# Patient Record
Sex: FEMALE | Race: WHITE | NOT HISPANIC OR LATINO | URBAN - METROPOLITAN AREA
[De-identification: names, ages, dates, MRNs, and addresses within clinical notes are randomized per-mention and may not be internally consistent; named-entity substitution may affect disease eponyms.]

---

## 2017-02-09 ENCOUNTER — COMMUNICATION - HEALTHEAST (OUTPATIENT)
Dept: INTERNAL MEDICINE | Facility: CLINIC | Age: 82
End: 2017-02-09

## 2017-03-13 ENCOUNTER — COMMUNICATION - HEALTHEAST (OUTPATIENT)
Dept: INTERNAL MEDICINE | Facility: CLINIC | Age: 82
End: 2017-03-13

## 2017-03-19 ENCOUNTER — COMMUNICATION - HEALTHEAST (OUTPATIENT)
Dept: INTERNAL MEDICINE | Facility: CLINIC | Age: 82
End: 2017-03-19

## 2017-04-05 ENCOUNTER — COMMUNICATION - HEALTHEAST (OUTPATIENT)
Dept: INTERNAL MEDICINE | Facility: CLINIC | Age: 82
End: 2017-04-05

## 2017-04-05 DIAGNOSIS — K21.00 REFLUX ESOPHAGITIS: ICD-10-CM

## 2017-07-01 ENCOUNTER — COMMUNICATION - HEALTHEAST (OUTPATIENT)
Dept: INTERNAL MEDICINE | Facility: CLINIC | Age: 82
End: 2017-07-01

## 2017-07-07 ENCOUNTER — COMMUNICATION - HEALTHEAST (OUTPATIENT)
Dept: INTERNAL MEDICINE | Facility: CLINIC | Age: 82
End: 2017-07-07

## 2017-07-28 ENCOUNTER — RECORDS - HEALTHEAST (OUTPATIENT)
Dept: ADMINISTRATIVE | Facility: OTHER | Age: 82
End: 2017-07-28

## 2017-07-28 ENCOUNTER — COMMUNICATION - HEALTHEAST (OUTPATIENT)
Dept: INTERNAL MEDICINE | Facility: CLINIC | Age: 82
End: 2017-07-28

## 2017-07-31 ENCOUNTER — COMMUNICATION - HEALTHEAST (OUTPATIENT)
Dept: INTERNAL MEDICINE | Facility: CLINIC | Age: 82
End: 2017-07-31

## 2017-08-03 ENCOUNTER — OFFICE VISIT - HEALTHEAST (OUTPATIENT)
Dept: INTERNAL MEDICINE | Facility: CLINIC | Age: 82
End: 2017-08-03

## 2017-08-03 ENCOUNTER — COMMUNICATION - HEALTHEAST (OUTPATIENT)
Dept: INTERNAL MEDICINE | Facility: CLINIC | Age: 82
End: 2017-08-03

## 2017-08-03 DIAGNOSIS — S70.02XA CONTUSION OF LEFT HIP: ICD-10-CM

## 2017-08-03 DIAGNOSIS — I21.4 NSTEMI (NON-ST ELEVATED MYOCARDIAL INFARCTION) (H): ICD-10-CM

## 2017-08-03 DIAGNOSIS — I25.5 ISCHEMIC CARDIOMYOPATHY: ICD-10-CM

## 2017-08-03 DIAGNOSIS — R55 SYNCOPE AND COLLAPSE: ICD-10-CM

## 2017-08-03 DIAGNOSIS — R73.9 HYPERGLYCEMIA: ICD-10-CM

## 2017-08-16 ENCOUNTER — COMMUNICATION - HEALTHEAST (OUTPATIENT)
Dept: INTERNAL MEDICINE | Facility: CLINIC | Age: 82
End: 2017-08-16

## 2017-08-17 ENCOUNTER — COMMUNICATION - HEALTHEAST (OUTPATIENT)
Dept: INTERNAL MEDICINE | Facility: CLINIC | Age: 82
End: 2017-08-17

## 2017-08-18 ENCOUNTER — RECORDS - HEALTHEAST (OUTPATIENT)
Dept: ADMINISTRATIVE | Facility: OTHER | Age: 82
End: 2017-08-18

## 2017-08-18 ENCOUNTER — COMMUNICATION - HEALTHEAST (OUTPATIENT)
Dept: INTERNAL MEDICINE | Facility: CLINIC | Age: 82
End: 2017-08-18

## 2017-08-21 ENCOUNTER — COMMUNICATION - HEALTHEAST (OUTPATIENT)
Dept: INTERNAL MEDICINE | Facility: CLINIC | Age: 82
End: 2017-08-21

## 2017-08-22 ENCOUNTER — COMMUNICATION - HEALTHEAST (OUTPATIENT)
Dept: INTERNAL MEDICINE | Facility: CLINIC | Age: 82
End: 2017-08-22

## 2017-08-22 ENCOUNTER — COMMUNICATION - HEALTHEAST (OUTPATIENT)
Dept: SCHEDULING | Facility: CLINIC | Age: 82
End: 2017-08-22

## 2017-08-22 ENCOUNTER — RECORDS - HEALTHEAST (OUTPATIENT)
Dept: ADMINISTRATIVE | Facility: OTHER | Age: 82
End: 2017-08-22

## 2017-08-25 ENCOUNTER — RECORDS - HEALTHEAST (OUTPATIENT)
Dept: ADMINISTRATIVE | Facility: OTHER | Age: 82
End: 2017-08-25

## 2017-08-29 ENCOUNTER — COMMUNICATION - HEALTHEAST (OUTPATIENT)
Dept: INTERNAL MEDICINE | Facility: CLINIC | Age: 82
End: 2017-08-29

## 2017-09-19 ENCOUNTER — RECORDS - HEALTHEAST (OUTPATIENT)
Dept: ADMINISTRATIVE | Facility: OTHER | Age: 82
End: 2017-09-19

## 2017-10-19 ENCOUNTER — COMMUNICATION - HEALTHEAST (OUTPATIENT)
Dept: INTERNAL MEDICINE | Facility: CLINIC | Age: 82
End: 2017-10-19

## 2017-11-20 ENCOUNTER — COMMUNICATION - HEALTHEAST (OUTPATIENT)
Dept: INTERNAL MEDICINE | Facility: CLINIC | Age: 82
End: 2017-11-20

## 2017-11-20 DIAGNOSIS — M25.559 HIP PAIN: ICD-10-CM

## 2018-01-16 ENCOUNTER — COMMUNICATION - HEALTHEAST (OUTPATIENT)
Dept: INTERNAL MEDICINE | Facility: CLINIC | Age: 83
End: 2018-01-16

## 2018-01-17 RX ORDER — ATORVASTATIN CALCIUM 40 MG/1
TABLET, FILM COATED ORAL
Qty: 28 TABLET | Refills: 11 | Status: SHIPPED | OUTPATIENT
Start: 2018-01-17

## 2018-01-24 ENCOUNTER — RECORDS - HEALTHEAST (OUTPATIENT)
Dept: LAB | Facility: CLINIC | Age: 83
End: 2018-01-24

## 2018-01-24 LAB
FLUAV AG SPEC QL IA: NORMAL
FLUBV AG SPEC QL IA: NORMAL

## 2018-02-13 ENCOUNTER — COMMUNICATION - HEALTHEAST (OUTPATIENT)
Dept: INTERNAL MEDICINE | Facility: CLINIC | Age: 83
End: 2018-02-13

## 2018-02-13 DIAGNOSIS — I50.21 ACUTE SYSTOLIC CHF (CONGESTIVE HEART FAILURE) (H): ICD-10-CM

## 2018-02-13 DIAGNOSIS — I10 HTN (HYPERTENSION): ICD-10-CM

## 2018-02-13 DIAGNOSIS — E87.8 HYPERCHLOREMIA: ICD-10-CM

## 2018-02-13 DIAGNOSIS — R07.9 CHEST PAIN: ICD-10-CM

## 2018-02-13 DIAGNOSIS — E87.20 METABOLIC ACIDOSIS: ICD-10-CM

## 2018-02-13 DIAGNOSIS — M16.9 DEGENERATIVE ARTHRITIS OF HIP: ICD-10-CM

## 2018-02-13 DIAGNOSIS — I21.4 NSTEMI (NON-ST ELEVATED MYOCARDIAL INFARCTION) (H): ICD-10-CM

## 2018-02-13 DIAGNOSIS — I25.10 CAD (CORONARY ARTERY DISEASE): ICD-10-CM

## 2018-02-13 DIAGNOSIS — I25.5 CARDIOMYOPATHY, ISCHEMIC: ICD-10-CM

## 2018-02-13 DIAGNOSIS — I63.9 CEREBRAL INFARCT (H): ICD-10-CM

## 2018-02-14 RX ORDER — FERROUS GLUCONATE 324(37.5)
TABLET ORAL
Qty: 28 TABLET | Refills: 10 | Status: SHIPPED | OUTPATIENT
Start: 2018-02-14

## 2018-03-05 ENCOUNTER — RECORDS - HEALTHEAST (OUTPATIENT)
Dept: LAB | Facility: CLINIC | Age: 83
End: 2018-03-05

## 2018-03-05 LAB
25(OH)D3 SERPL-MCNC: 45.2 NG/ML (ref 30–80)
ALBUMIN SERPL-MCNC: 3.5 G/DL (ref 3.5–5)
ALP SERPL-CCNC: 68 U/L (ref 45–120)
ALT SERPL W P-5'-P-CCNC: 11 U/L (ref 0–45)
ANION GAP SERPL CALCULATED.3IONS-SCNC: 12 MMOL/L (ref 5–18)
AST SERPL W P-5'-P-CCNC: 17 U/L (ref 0–40)
BASOPHILS # BLD AUTO: 0 THOU/UL (ref 0–0.2)
BASOPHILS NFR BLD AUTO: 1 % (ref 0–2)
BILIRUB SERPL-MCNC: 0.6 MG/DL (ref 0–1)
BUN SERPL-MCNC: 21 MG/DL (ref 8–28)
CALCIUM SERPL-MCNC: 9 MG/DL (ref 8.5–10.5)
CHLORIDE BLD-SCNC: 105 MMOL/L (ref 98–107)
CO2 SERPL-SCNC: 22 MMOL/L (ref 22–31)
CREAT SERPL-MCNC: 0.97 MG/DL (ref 0.6–1.1)
EOSINOPHIL # BLD AUTO: 0.2 THOU/UL (ref 0–0.4)
EOSINOPHIL NFR BLD AUTO: 4 % (ref 0–6)
ERYTHROCYTE [DISTWIDTH] IN BLOOD BY AUTOMATED COUNT: 14.5 % (ref 11–14.5)
FERRITIN SERPL-MCNC: 22 NG/ML (ref 10–130)
GFR SERPL CREATININE-BSD FRML MDRD: 54 ML/MIN/1.73M2
GLUCOSE BLD-MCNC: 73 MG/DL (ref 70–125)
HCT VFR BLD AUTO: 33.3 % (ref 35–47)
HGB BLD-MCNC: 11 G/DL (ref 12–16)
IRON SATN MFR SERPL: 91 % (ref 20–50)
IRON SERPL-MCNC: 297 UG/DL (ref 42–175)
LYMPHOCYTES # BLD AUTO: 1.6 THOU/UL (ref 0.8–4.4)
LYMPHOCYTES NFR BLD AUTO: 37 % (ref 20–40)
MCH RBC QN AUTO: 34.4 PG (ref 27–34)
MCHC RBC AUTO-ENTMCNC: 33 G/DL (ref 32–36)
MCV RBC AUTO: 104 FL (ref 80–100)
MONOCYTES # BLD AUTO: 0.4 THOU/UL (ref 0–0.9)
MONOCYTES NFR BLD AUTO: 10 % (ref 2–10)
NEUTROPHILS # BLD AUTO: 2 THOU/UL (ref 2–7.7)
NEUTROPHILS NFR BLD AUTO: 49 % (ref 50–70)
PLATELET # BLD AUTO: 195 THOU/UL (ref 140–440)
PMV BLD AUTO: 9.9 FL (ref 8.5–12.5)
POTASSIUM BLD-SCNC: 4.5 MMOL/L (ref 3.5–5)
PROT SERPL-MCNC: 6.2 G/DL (ref 6–8)
RBC # BLD AUTO: 3.2 MILL/UL (ref 3.8–5.4)
SODIUM SERPL-SCNC: 139 MMOL/L (ref 136–145)
TIBC SERPL-MCNC: 328 UG/DL (ref 313–563)
TRANSFERRIN SERPL-MCNC: 262 MG/DL (ref 212–360)
WBC: 4.2 THOU/UL (ref 4–11)

## 2018-09-24 ENCOUNTER — RECORDS - HEALTHEAST (OUTPATIENT)
Dept: LAB | Facility: CLINIC | Age: 83
End: 2018-09-24

## 2018-09-24 LAB
25(OH)D3 SERPL-MCNC: 37.6 NG/ML (ref 30–80)
ANION GAP SERPL CALCULATED.3IONS-SCNC: 10 MMOL/L (ref 5–18)
BUN SERPL-MCNC: 28 MG/DL (ref 8–28)
CALCIUM SERPL-MCNC: 9.3 MG/DL (ref 8.5–10.5)
CHLORIDE BLD-SCNC: 107 MMOL/L (ref 98–107)
CO2 SERPL-SCNC: 23 MMOL/L (ref 22–31)
CREAT SERPL-MCNC: 1.16 MG/DL (ref 0.6–1.1)
ERYTHROCYTE [DISTWIDTH] IN BLOOD BY AUTOMATED COUNT: 13.2 % (ref 11–14.5)
GFR SERPL CREATININE-BSD FRML MDRD: 44 ML/MIN/1.73M2
GLUCOSE BLD-MCNC: 73 MG/DL (ref 70–125)
HCT VFR BLD AUTO: 34.5 % (ref 35–47)
HGB BLD-MCNC: 11 G/DL (ref 12–16)
MCH RBC QN AUTO: 36.3 PG (ref 27–34)
MCHC RBC AUTO-ENTMCNC: 31.9 G/DL (ref 32–36)
MCV RBC AUTO: 114 FL (ref 80–100)
PLATELET # BLD AUTO: 195 THOU/UL (ref 140–440)
PMV BLD AUTO: 9.6 FL (ref 8.5–12.5)
POTASSIUM BLD-SCNC: 3.6 MMOL/L (ref 3.5–5)
RBC # BLD AUTO: 3.03 MILL/UL (ref 3.8–5.4)
SODIUM SERPL-SCNC: 140 MMOL/L (ref 136–145)
WBC: 3.7 THOU/UL (ref 4–11)

## 2018-11-15 ENCOUNTER — RECORDS - HEALTHEAST (OUTPATIENT)
Dept: LAB | Facility: CLINIC | Age: 83
End: 2018-11-15

## 2018-11-15 LAB
ANION GAP SERPL CALCULATED.3IONS-SCNC: 12 MMOL/L (ref 5–18)
BUN SERPL-MCNC: 46 MG/DL (ref 8–28)
CALCIUM SERPL-MCNC: 9.4 MG/DL (ref 8.5–10.5)
CHLORIDE BLD-SCNC: 104 MMOL/L (ref 98–107)
CO2 SERPL-SCNC: 24 MMOL/L (ref 22–31)
CREAT SERPL-MCNC: 1.64 MG/DL (ref 0.6–1.1)
FERRITIN SERPL-MCNC: 83 NG/ML (ref 10–130)
GFR SERPL CREATININE-BSD FRML MDRD: 29 ML/MIN/1.73M2
GLUCOSE BLD-MCNC: 76 MG/DL (ref 70–125)
POTASSIUM BLD-SCNC: 3.9 MMOL/L (ref 3.5–5)
SODIUM SERPL-SCNC: 140 MMOL/L (ref 136–145)

## 2019-01-02 ENCOUNTER — RECORDS - HEALTHEAST (OUTPATIENT)
Dept: LAB | Facility: CLINIC | Age: 84
End: 2019-01-02

## 2019-01-02 LAB
ANION GAP SERPL CALCULATED.3IONS-SCNC: 12 MMOL/L (ref 5–18)
BUN SERPL-MCNC: 40 MG/DL (ref 8–28)
CALCIUM SERPL-MCNC: 8.7 MG/DL (ref 8.5–10.5)
CHLORIDE BLD-SCNC: 110 MMOL/L (ref 98–107)
CO2 SERPL-SCNC: 21 MMOL/L (ref 22–31)
CREAT SERPL-MCNC: 1.18 MG/DL (ref 0.6–1.1)
FOLATE SERPL-MCNC: 2.3 NG/ML
GFR SERPL CREATININE-BSD FRML MDRD: 43 ML/MIN/1.73M2
GLUCOSE BLD-MCNC: 63 MG/DL (ref 70–125)
POTASSIUM BLD-SCNC: 4.3 MMOL/L (ref 3.5–5)
SODIUM SERPL-SCNC: 143 MMOL/L (ref 136–145)

## 2019-02-22 ENCOUNTER — AMBULATORY - HEALTHEAST (OUTPATIENT)
Dept: OTHER | Facility: CLINIC | Age: 84
End: 2019-02-22

## 2019-05-20 ENCOUNTER — RECORDS - HEALTHEAST (OUTPATIENT)
Dept: LAB | Facility: CLINIC | Age: 84
End: 2019-05-20

## 2019-05-20 LAB
ALBUMIN SERPL-MCNC: 3.6 G/DL (ref 3.5–5)
ALP SERPL-CCNC: 71 U/L (ref 45–120)
ALT SERPL W P-5'-P-CCNC: <9 U/L (ref 0–45)
ANION GAP SERPL CALCULATED.3IONS-SCNC: 18 MMOL/L (ref 5–18)
AST SERPL W P-5'-P-CCNC: 18 U/L (ref 0–40)
BILIRUB SERPL-MCNC: 0.2 MG/DL (ref 0–1)
BUN SERPL-MCNC: 36 MG/DL (ref 8–28)
CALCIUM SERPL-MCNC: 9.1 MG/DL (ref 8.5–10.5)
CHLORIDE BLD-SCNC: 115 MMOL/L (ref 98–107)
CO2 SERPL-SCNC: 12 MMOL/L (ref 22–31)
CREAT SERPL-MCNC: 1.29 MG/DL (ref 0.6–1.1)
ERYTHROCYTE [DISTWIDTH] IN BLOOD BY AUTOMATED COUNT: 13.6 % (ref 11–14.5)
GFR SERPL CREATININE-BSD FRML MDRD: 39 ML/MIN/1.73M2
GLUCOSE BLD-MCNC: 59 MG/DL (ref 70–125)
HCT VFR BLD AUTO: 32 % (ref 35–47)
HGB BLD-MCNC: 9.1 G/DL (ref 12–16)
MCH RBC QN AUTO: 28.3 PG (ref 27–34)
MCHC RBC AUTO-ENTMCNC: 28.4 G/DL (ref 32–36)
MCV RBC AUTO: 99 FL (ref 80–100)
PLATELET # BLD AUTO: 222 THOU/UL (ref 140–440)
PMV BLD AUTO: 9.4 FL (ref 8.5–12.5)
POTASSIUM BLD-SCNC: 4.4 MMOL/L (ref 3.5–5)
PROT SERPL-MCNC: 6.4 G/DL (ref 6–8)
RBC # BLD AUTO: 3.22 MILL/UL (ref 3.8–5.4)
SODIUM SERPL-SCNC: 145 MMOL/L (ref 136–145)
VIT B12 SERPL-MCNC: 292 PG/ML (ref 213–816)
WBC: 5.4 THOU/UL (ref 4–11)

## 2019-05-22 ENCOUNTER — RECORDS - HEALTHEAST (OUTPATIENT)
Dept: LAB | Facility: CLINIC | Age: 84
End: 2019-05-22

## 2019-05-22 LAB
FERRITIN SERPL-MCNC: 31 NG/ML (ref 10–130)
IRON SATN MFR SERPL: 8 % (ref 20–50)
IRON SERPL-MCNC: 23 UG/DL (ref 42–175)
TIBC SERPL-MCNC: 286 UG/DL (ref 313–563)
TRANSFERRIN SERPL-MCNC: 230 MG/DL (ref 212–360)

## 2019-11-08 ENCOUNTER — OFFICE VISIT - HEALTHEAST (OUTPATIENT)
Dept: GERIATRICS | Facility: CLINIC | Age: 84
End: 2019-11-08

## 2019-11-08 DIAGNOSIS — I25.5 ISCHEMIC CARDIOMYOPATHY: ICD-10-CM

## 2019-11-08 DIAGNOSIS — N17.9 AKI (ACUTE KIDNEY INJURY) (H): ICD-10-CM

## 2019-11-08 DIAGNOSIS — M10.9 ACUTE GOUT INVOLVING TOE, UNSPECIFIED CAUSE, UNSPECIFIED LATERALITY: ICD-10-CM

## 2019-11-08 DIAGNOSIS — M79.671 FOOT PAIN, BILATERAL: ICD-10-CM

## 2019-11-08 DIAGNOSIS — R53.81 PHYSICAL DECONDITIONING: ICD-10-CM

## 2019-11-08 DIAGNOSIS — I10 ESSENTIAL HYPERTENSION: ICD-10-CM

## 2019-11-08 DIAGNOSIS — I25.10 ATHEROSCLEROSIS OF CORONARY ARTERY OF NATIVE HEART WITHOUT ANGINA PECTORIS, UNSPECIFIED VESSEL OR LESION TYPE: ICD-10-CM

## 2019-11-08 DIAGNOSIS — K52.9 COLITIS: ICD-10-CM

## 2019-11-08 DIAGNOSIS — M79.672 FOOT PAIN, BILATERAL: ICD-10-CM

## 2019-11-11 ENCOUNTER — RECORDS - HEALTHEAST (OUTPATIENT)
Dept: LAB | Facility: CLINIC | Age: 84
End: 2019-11-11

## 2019-11-11 ENCOUNTER — OFFICE VISIT - HEALTHEAST (OUTPATIENT)
Dept: GERIATRICS | Facility: CLINIC | Age: 84
End: 2019-11-11

## 2019-11-11 DIAGNOSIS — R53.81 PHYSICAL DECONDITIONING: ICD-10-CM

## 2019-11-11 DIAGNOSIS — M10.9 ACUTE GOUT OF FOOT, UNSPECIFIED CAUSE, UNSPECIFIED LATERALITY: ICD-10-CM

## 2019-11-11 DIAGNOSIS — D64.9 ANEMIA, UNSPECIFIED TYPE: ICD-10-CM

## 2019-11-11 DIAGNOSIS — M79.671 PAIN IN BOTH FEET: ICD-10-CM

## 2019-11-11 DIAGNOSIS — M79.672 PAIN IN BOTH FEET: ICD-10-CM

## 2019-11-11 LAB
ANION GAP SERPL CALCULATED.3IONS-SCNC: 7 MMOL/L (ref 5–18)
BUN SERPL-MCNC: 26 MG/DL (ref 8–28)
CALCIUM SERPL-MCNC: 8.1 MG/DL (ref 8.5–10.5)
CHLORIDE BLD-SCNC: 115 MMOL/L (ref 98–107)
CO2 SERPL-SCNC: 21 MMOL/L (ref 22–31)
CREAT SERPL-MCNC: 0.91 MG/DL (ref 0.6–1.1)
ERYTHROCYTE [DISTWIDTH] IN BLOOD BY AUTOMATED COUNT: 14 % (ref 11–14.5)
GFR SERPL CREATININE-BSD FRML MDRD: 58 ML/MIN/1.73M2
GLUCOSE BLD-MCNC: 78 MG/DL (ref 70–125)
HCT VFR BLD AUTO: 27 % (ref 35–47)
HGB BLD-MCNC: 8.5 G/DL (ref 12–16)
MAGNESIUM SERPL-MCNC: 1.8 MG/DL (ref 1.8–2.6)
MCH RBC QN AUTO: 33.9 PG (ref 27–34)
MCHC RBC AUTO-ENTMCNC: 31.5 G/DL (ref 32–36)
MCV RBC AUTO: 108 FL (ref 80–100)
PLATELET # BLD AUTO: 300 THOU/UL (ref 140–440)
PMV BLD AUTO: 9.6 FL (ref 8.5–12.5)
POTASSIUM BLD-SCNC: 3.8 MMOL/L (ref 3.5–5)
RBC # BLD AUTO: 2.51 MILL/UL (ref 3.8–5.4)
SODIUM SERPL-SCNC: 143 MMOL/L (ref 136–145)
WBC: 9.7 THOU/UL (ref 4–11)

## 2019-11-12 ENCOUNTER — OFFICE VISIT - HEALTHEAST (OUTPATIENT)
Dept: GERIATRICS | Facility: CLINIC | Age: 84
End: 2019-11-12

## 2019-11-12 ENCOUNTER — RECORDS - HEALTHEAST (OUTPATIENT)
Dept: LAB | Facility: CLINIC | Age: 84
End: 2019-11-12

## 2019-11-12 DIAGNOSIS — I25.10 CORONARY ARTERY DISEASE INVOLVING NATIVE CORONARY ARTERY OF NATIVE HEART WITHOUT ANGINA PECTORIS: ICD-10-CM

## 2019-11-12 DIAGNOSIS — R53.81 PHYSICAL DECONDITIONING: ICD-10-CM

## 2019-11-12 DIAGNOSIS — K29.70 GASTRITIS, PRESENCE OF BLEEDING UNSPECIFIED, UNSPECIFIED CHRONICITY, UNSPECIFIED GASTRITIS TYPE: ICD-10-CM

## 2019-11-12 DIAGNOSIS — K52.9 COLITIS: ICD-10-CM

## 2019-11-12 DIAGNOSIS — M10.9 ACUTE GOUT OF FOOT, UNSPECIFIED CAUSE, UNSPECIFIED LATERALITY: ICD-10-CM

## 2019-11-12 DIAGNOSIS — I10 ESSENTIAL HYPERTENSION: ICD-10-CM

## 2019-11-12 DIAGNOSIS — I50.32 CHRONIC DIASTOLIC CONGESTIVE HEART FAILURE (H): ICD-10-CM

## 2019-11-12 DIAGNOSIS — D50.9 IRON DEFICIENCY ANEMIA, UNSPECIFIED IRON DEFICIENCY ANEMIA TYPE: ICD-10-CM

## 2019-11-12 DIAGNOSIS — F41.8 DEPRESSION WITH ANXIETY: ICD-10-CM

## 2019-11-12 DIAGNOSIS — R41.89 COGNITIVE IMPAIRMENT: ICD-10-CM

## 2019-11-12 DIAGNOSIS — D64.9 ACUTE ANEMIA: ICD-10-CM

## 2019-11-12 LAB
C REACTIVE PROTEIN LHE: 4.6 MG/DL (ref 0–0.8)
ERYTHROCYTE [SEDIMENTATION RATE] IN BLOOD BY WESTERGREN METHOD: 43 MM/HR (ref 0–20)
URATE SERPL-MCNC: 8.6 MG/DL (ref 2–7.5)

## 2019-11-13 ENCOUNTER — OFFICE VISIT - HEALTHEAST (OUTPATIENT)
Dept: GERIATRICS | Facility: CLINIC | Age: 84
End: 2019-11-13

## 2019-11-13 DIAGNOSIS — M10.9 ACUTE GOUT OF FOOT, UNSPECIFIED CAUSE, UNSPECIFIED LATERALITY: ICD-10-CM

## 2019-11-13 DIAGNOSIS — M79.671 BILATERAL FOOT PAIN: ICD-10-CM

## 2019-11-13 DIAGNOSIS — R53.81 PHYSICAL DECONDITIONING: ICD-10-CM

## 2019-11-13 DIAGNOSIS — M79.672 BILATERAL FOOT PAIN: ICD-10-CM

## 2019-11-13 RX ORDER — COLCHICINE 0.6 MG/1
0.6 TABLET ORAL 2 TIMES DAILY
Status: SHIPPED | COMMUNITY
Start: 2019-11-13

## 2019-11-14 ENCOUNTER — OFFICE VISIT - HEALTHEAST (OUTPATIENT)
Dept: GERIATRICS | Facility: CLINIC | Age: 84
End: 2019-11-14

## 2019-11-14 DIAGNOSIS — R53.81 PHYSICAL DECONDITIONING: ICD-10-CM

## 2019-11-14 DIAGNOSIS — D64.9 ANEMIA, UNSPECIFIED TYPE: ICD-10-CM

## 2019-11-14 DIAGNOSIS — M79.672 BILATERAL FOOT PAIN: ICD-10-CM

## 2019-11-14 DIAGNOSIS — M10.9 ACUTE GOUT OF FOOT, UNSPECIFIED CAUSE, UNSPECIFIED LATERALITY: ICD-10-CM

## 2019-11-14 DIAGNOSIS — M79.671 BILATERAL FOOT PAIN: ICD-10-CM

## 2019-11-14 DIAGNOSIS — T73.3XXA FATIGUE DUE TO EXCESSIVE EXERTION, INITIAL ENCOUNTER: ICD-10-CM

## 2019-11-15 ENCOUNTER — OFFICE VISIT - HEALTHEAST (OUTPATIENT)
Dept: GERIATRICS | Facility: CLINIC | Age: 84
End: 2019-11-15

## 2019-11-15 ENCOUNTER — RECORDS - HEALTHEAST (OUTPATIENT)
Dept: LAB | Facility: CLINIC | Age: 84
End: 2019-11-15

## 2019-11-15 DIAGNOSIS — M10.9 ACUTE GOUT OF FOOT, UNSPECIFIED CAUSE, UNSPECIFIED LATERALITY: ICD-10-CM

## 2019-11-15 DIAGNOSIS — D64.9 ANEMIA, UNSPECIFIED TYPE: ICD-10-CM

## 2019-11-15 DIAGNOSIS — R53.81 PHYSICAL DECONDITIONING: ICD-10-CM

## 2019-11-15 DIAGNOSIS — E87.6 HYPOKALEMIA: ICD-10-CM

## 2019-11-15 LAB
ALBUMIN SERPL-MCNC: 2.8 G/DL (ref 3.5–5)
ALP SERPL-CCNC: 85 U/L (ref 45–120)
ALT SERPL W P-5'-P-CCNC: 28 U/L (ref 0–45)
ANION GAP SERPL CALCULATED.3IONS-SCNC: 9 MMOL/L (ref 5–18)
AST SERPL W P-5'-P-CCNC: 33 U/L (ref 0–40)
BASOPHILS # BLD AUTO: 0 THOU/UL (ref 0–0.2)
BASOPHILS NFR BLD AUTO: 0 % (ref 0–2)
BILIRUB SERPL-MCNC: 0.3 MG/DL (ref 0–1)
BUN SERPL-MCNC: 25 MG/DL (ref 8–28)
C REACTIVE PROTEIN LHE: 1.6 MG/DL (ref 0–0.8)
CALCIUM SERPL-MCNC: 8.5 MG/DL (ref 8.5–10.5)
CHLORIDE BLD-SCNC: 115 MMOL/L (ref 98–107)
CO2 SERPL-SCNC: 19 MMOL/L (ref 22–31)
CREAT SERPL-MCNC: 0.92 MG/DL (ref 0.6–1.1)
EOSINOPHIL # BLD AUTO: 0 THOU/UL (ref 0–0.4)
EOSINOPHIL NFR BLD AUTO: 0 % (ref 0–6)
ERYTHROCYTE [DISTWIDTH] IN BLOOD BY AUTOMATED COUNT: 14 % (ref 11–14.5)
GFR SERPL CREATININE-BSD FRML MDRD: 57 ML/MIN/1.73M2
GLUCOSE BLD-MCNC: 69 MG/DL (ref 70–125)
HCT VFR BLD AUTO: 33.2 % (ref 35–47)
HGB BLD-MCNC: 10.1 G/DL (ref 12–16)
LYMPHOCYTES # BLD AUTO: 1.4 THOU/UL (ref 0.8–4.4)
LYMPHOCYTES NFR BLD AUTO: 24 % (ref 20–40)
MCH RBC QN AUTO: 33 PG (ref 27–34)
MCHC RBC AUTO-ENTMCNC: 30.4 G/DL (ref 32–36)
MCV RBC AUTO: 109 FL (ref 80–100)
MONOCYTES # BLD AUTO: 0.6 THOU/UL (ref 0–0.9)
MONOCYTES NFR BLD AUTO: 10 % (ref 2–10)
NEUTROPHILS # BLD AUTO: 3.7 THOU/UL (ref 2–7.7)
NEUTROPHILS NFR BLD AUTO: 64 % (ref 50–70)
PLATELET # BLD AUTO: 388 THOU/UL (ref 140–440)
PMV BLD AUTO: 9.4 FL (ref 8.5–12.5)
POTASSIUM BLD-SCNC: 3.2 MMOL/L (ref 3.5–5)
PROT SERPL-MCNC: 5.9 G/DL (ref 6–8)
RBC # BLD AUTO: 3.06 MILL/UL (ref 3.8–5.4)
SODIUM SERPL-SCNC: 143 MMOL/L (ref 136–145)
WBC: 5.7 THOU/UL (ref 4–11)

## 2019-11-18 ENCOUNTER — RECORDS - HEALTHEAST (OUTPATIENT)
Dept: LAB | Facility: CLINIC | Age: 84
End: 2019-11-18

## 2019-11-18 ENCOUNTER — OFFICE VISIT - HEALTHEAST (OUTPATIENT)
Dept: GERIATRICS | Facility: CLINIC | Age: 84
End: 2019-11-18

## 2019-11-18 DIAGNOSIS — M10.9 ACUTE GOUT OF FOOT, UNSPECIFIED CAUSE, UNSPECIFIED LATERALITY: ICD-10-CM

## 2019-11-18 DIAGNOSIS — S91.109A OPEN WOUND OF TOE, INITIAL ENCOUNTER: ICD-10-CM

## 2019-11-18 DIAGNOSIS — R53.81 PHYSICAL DECONDITIONING: ICD-10-CM

## 2019-11-18 DIAGNOSIS — E87.6 HYPOKALEMIA: ICD-10-CM

## 2019-11-18 LAB — POTASSIUM BLD-SCNC: 3.9 MMOL/L (ref 3.5–5)

## 2019-11-19 RX ORDER — POTASSIUM CHLORIDE 750 MG/1
20 TABLET, EXTENDED RELEASE ORAL DAILY
Status: SHIPPED | COMMUNITY
Start: 2019-11-19

## 2019-11-21 ENCOUNTER — OFFICE VISIT - HEALTHEAST (OUTPATIENT)
Dept: GERIATRICS | Facility: CLINIC | Age: 84
End: 2019-11-21

## 2019-11-21 ENCOUNTER — RECORDS - HEALTHEAST (OUTPATIENT)
Dept: LAB | Facility: CLINIC | Age: 84
End: 2019-11-21

## 2019-11-21 DIAGNOSIS — R53.81 PHYSICAL DECONDITIONING: ICD-10-CM

## 2019-11-21 DIAGNOSIS — S91.109D OPEN WOUND OF TOE, SUBSEQUENT ENCOUNTER: ICD-10-CM

## 2019-11-21 DIAGNOSIS — M10.9 ACUTE GOUT OF FOOT, UNSPECIFIED CAUSE, UNSPECIFIED LATERALITY: ICD-10-CM

## 2019-11-21 LAB
ANION GAP SERPL CALCULATED.3IONS-SCNC: 16 MMOL/L (ref 5–18)
BUN SERPL-MCNC: 23 MG/DL (ref 8–28)
C REACTIVE PROTEIN LHE: 2.2 MG/DL (ref 0–0.8)
CALCIUM SERPL-MCNC: 8.2 MG/DL (ref 8.5–10.5)
CHLORIDE BLD-SCNC: 116 MMOL/L (ref 98–107)
CO2 SERPL-SCNC: 9 MMOL/L (ref 22–31)
CREAT SERPL-MCNC: 1.31 MG/DL (ref 0.6–1.1)
GFR SERPL CREATININE-BSD FRML MDRD: 38 ML/MIN/1.73M2
GLUCOSE BLD-MCNC: 35 MG/DL (ref 70–125)
POTASSIUM BLD-SCNC: 3.8 MMOL/L (ref 3.5–5)
SODIUM SERPL-SCNC: 141 MMOL/L (ref 136–145)
URATE SERPL-MCNC: 10.1 MG/DL (ref 2–7.5)

## 2019-11-25 ENCOUNTER — RECORDS - HEALTHEAST (OUTPATIENT)
Dept: LAB | Facility: CLINIC | Age: 84
End: 2019-11-25

## 2019-11-25 ENCOUNTER — OFFICE VISIT - HEALTHEAST (OUTPATIENT)
Dept: GERIATRICS | Facility: CLINIC | Age: 84
End: 2019-11-25

## 2019-11-25 DIAGNOSIS — S91.109D OPEN WOUND OF TOE, SUBSEQUENT ENCOUNTER: ICD-10-CM

## 2019-11-25 DIAGNOSIS — R53.81 PHYSICAL DECONDITIONING: ICD-10-CM

## 2019-11-25 DIAGNOSIS — M10.9 ACUTE GOUT OF FOOT, UNSPECIFIED CAUSE, UNSPECIFIED LATERALITY: ICD-10-CM

## 2019-11-25 LAB — C REACTIVE PROTEIN LHE: 0.1 MG/DL (ref 0–0.8)

## 2019-11-29 ENCOUNTER — RECORDS - HEALTHEAST (OUTPATIENT)
Dept: LAB | Facility: CLINIC | Age: 84
End: 2019-11-29

## 2019-11-29 ENCOUNTER — OFFICE VISIT - HEALTHEAST (OUTPATIENT)
Dept: GERIATRICS | Facility: CLINIC | Age: 84
End: 2019-11-29

## 2019-11-29 DIAGNOSIS — R63.0 DECREASED APPETITE: ICD-10-CM

## 2019-11-29 DIAGNOSIS — N17.9 AKI (ACUTE KIDNEY INJURY) (H): ICD-10-CM

## 2019-11-29 DIAGNOSIS — R53.81 PHYSICAL DECONDITIONING: ICD-10-CM

## 2019-11-29 LAB
ANION GAP SERPL CALCULATED.3IONS-SCNC: 10 MMOL/L (ref 5–18)
BUN SERPL-MCNC: 45 MG/DL (ref 8–28)
CALCIUM SERPL-MCNC: 8.9 MG/DL (ref 8.5–10.5)
CHLORIDE BLD-SCNC: 115 MMOL/L (ref 98–107)
CO2 SERPL-SCNC: 16 MMOL/L (ref 22–31)
CREAT SERPL-MCNC: 1.82 MG/DL (ref 0.6–1.1)
ERYTHROCYTE [DISTWIDTH] IN BLOOD BY AUTOMATED COUNT: 14.2 % (ref 11–14.5)
GFR SERPL CREATININE-BSD FRML MDRD: 26 ML/MIN/1.73M2
GLUCOSE BLD-MCNC: 69 MG/DL (ref 70–125)
HCT VFR BLD AUTO: 39.9 % (ref 35–47)
HGB BLD-MCNC: 12.6 G/DL (ref 12–16)
MCH RBC QN AUTO: 32.9 PG (ref 27–34)
MCHC RBC AUTO-ENTMCNC: 31.6 G/DL (ref 32–36)
MCV RBC AUTO: 104 FL (ref 80–100)
PLATELET # BLD AUTO: 175 THOU/UL (ref 140–440)
PMV BLD AUTO: 10.9 FL (ref 8.5–12.5)
POTASSIUM BLD-SCNC: 4.2 MMOL/L (ref 3.5–5)
RBC # BLD AUTO: 3.83 MILL/UL (ref 3.8–5.4)
SODIUM SERPL-SCNC: 141 MMOL/L (ref 136–145)
WBC: 9.3 THOU/UL (ref 4–11)

## 2019-12-02 ENCOUNTER — RECORDS - HEALTHEAST (OUTPATIENT)
Dept: LAB | Facility: CLINIC | Age: 84
End: 2019-12-02

## 2019-12-02 ENCOUNTER — OFFICE VISIT - HEALTHEAST (OUTPATIENT)
Dept: GERIATRICS | Facility: CLINIC | Age: 84
End: 2019-12-02

## 2019-12-02 DIAGNOSIS — R19.7 DIARRHEA OF PRESUMED INFECTIOUS ORIGIN: ICD-10-CM

## 2019-12-02 DIAGNOSIS — R53.81 PHYSICAL DECONDITIONING: ICD-10-CM

## 2019-12-02 DIAGNOSIS — N17.9 AKI (ACUTE KIDNEY INJURY) (H): ICD-10-CM

## 2019-12-02 LAB
ALBUMIN SERPL-MCNC: 2.9 G/DL (ref 3.5–5)
ALBUMIN UR-MCNC: ABNORMAL MG/DL
ALP SERPL-CCNC: 115 U/L (ref 45–120)
ALT SERPL W P-5'-P-CCNC: 31 U/L (ref 0–45)
ANION GAP SERPL CALCULATED.3IONS-SCNC: 13 MMOL/L (ref 5–18)
APPEARANCE UR: CLEAR
AST SERPL W P-5'-P-CCNC: 38 U/L (ref 0–40)
BACTERIA #/AREA URNS HPF: ABNORMAL HPF
BILIRUB SERPL-MCNC: 0.4 MG/DL (ref 0–1)
BILIRUB UR QL STRIP: NEGATIVE
BUN SERPL-MCNC: 78 MG/DL (ref 8–28)
CALCIUM SERPL-MCNC: 8.3 MG/DL (ref 8.5–10.5)
CHLORIDE BLD-SCNC: 114 MMOL/L (ref 98–107)
CO2 SERPL-SCNC: 9 MMOL/L (ref 22–31)
COLOR UR AUTO: YELLOW
CREAT SERPL-MCNC: 3.27 MG/DL (ref 0.6–1.1)
ERYTHROCYTE [DISTWIDTH] IN BLOOD BY AUTOMATED COUNT: 13.9 % (ref 11–14.5)
GFR SERPL CREATININE-BSD FRML MDRD: 13 ML/MIN/1.73M2
GLUCOSE BLD-MCNC: 197 MG/DL (ref 70–125)
GLUCOSE UR STRIP-MCNC: NEGATIVE MG/DL
HCT VFR BLD AUTO: 46.7 % (ref 35–47)
HGB BLD-MCNC: 14.8 G/DL (ref 12–16)
HGB UR QL STRIP: NEGATIVE
HYALINE CASTS #/AREA URNS LPF: ABNORMAL LPF
KETONES UR STRIP-MCNC: NEGATIVE MG/DL
LEUKOCYTE ESTERASE UR QL STRIP: NEGATIVE
MCH RBC QN AUTO: 33.1 PG (ref 27–34)
MCHC RBC AUTO-ENTMCNC: 31.7 G/DL (ref 32–36)
MCV RBC AUTO: 105 FL (ref 80–100)
MUCOUS THREADS #/AREA URNS LPF: ABNORMAL LPF
NITRATE UR QL: NEGATIVE
PH UR STRIP: 5 [PH] (ref 4.5–8)
PLATELET # BLD AUTO: 173 THOU/UL (ref 140–440)
PMV BLD AUTO: 10.8 FL (ref 8.5–12.5)
POTASSIUM BLD-SCNC: 5 MMOL/L (ref 3.5–5)
PROCALCITONIN SERPL-MCNC: 0.24 NG/ML (ref 0–0.49)
PROT SERPL-MCNC: 5.3 G/DL (ref 6–8)
RBC # BLD AUTO: 4.47 MILL/UL (ref 3.8–5.4)
RBC #/AREA URNS AUTO: ABNORMAL HPF
SODIUM SERPL-SCNC: 136 MMOL/L (ref 136–145)
SP GR UR STRIP: 1.01 (ref 1–1.03)
SQUAMOUS #/AREA URNS AUTO: ABNORMAL LPF
UROBILINOGEN UR STRIP-ACNC: ABNORMAL
WBC #/AREA URNS AUTO: ABNORMAL HPF
WBC CLUMPS #/AREA URNS HPF: ABNORMAL /[HPF]
WBC: 7.8 THOU/UL (ref 4–11)

## 2019-12-03 ENCOUNTER — RECORDS - HEALTHEAST (OUTPATIENT)
Dept: LAB | Facility: CLINIC | Age: 84
End: 2019-12-03

## 2019-12-03 ENCOUNTER — COMMUNICATION - HEALTHEAST (OUTPATIENT)
Dept: GERIATRICS | Facility: CLINIC | Age: 84
End: 2019-12-03

## 2019-12-03 ENCOUNTER — OFFICE VISIT - HEALTHEAST (OUTPATIENT)
Dept: GERIATRICS | Facility: CLINIC | Age: 84
End: 2019-12-03

## 2019-12-03 DIAGNOSIS — A04.72 C. DIFFICILE COLITIS: ICD-10-CM

## 2019-12-03 DIAGNOSIS — N17.9 AKI (ACUTE KIDNEY INJURY) (H): ICD-10-CM

## 2019-12-03 DIAGNOSIS — R53.81 PHYSICAL DECONDITIONING: ICD-10-CM

## 2019-12-03 LAB
ANION GAP SERPL CALCULATED.3IONS-SCNC: 16 MMOL/L (ref 5–18)
BUN SERPL-MCNC: 71 MG/DL (ref 8–28)
C DIFF TOX B STL QL: POSITIVE
C REACTIVE PROTEIN LHE: 5 MG/DL (ref 0–0.8)
CALCIUM SERPL-MCNC: 8.6 MG/DL (ref 8.5–10.5)
CHLORIDE BLD-SCNC: 117 MMOL/L (ref 98–107)
CO2 SERPL-SCNC: 8 MMOL/L (ref 22–31)
CREAT SERPL-MCNC: 2.8 MG/DL (ref 0.6–1.1)
ERYTHROCYTE [DISTWIDTH] IN BLOOD BY AUTOMATED COUNT: 14 % (ref 11–14.5)
GFR SERPL CREATININE-BSD FRML MDRD: 16 ML/MIN/1.73M2
GLUCOSE BLD-MCNC: 84 MG/DL (ref 70–125)
HCT VFR BLD AUTO: 41.9 % (ref 35–47)
HGB BLD-MCNC: 13.1 G/DL (ref 12–16)
MCH RBC QN AUTO: 32.8 PG (ref 27–34)
MCHC RBC AUTO-ENTMCNC: 31.3 G/DL (ref 32–36)
MCV RBC AUTO: 105 FL (ref 80–100)
PLATELET # BLD AUTO: 185 THOU/UL (ref 140–440)
PMV BLD AUTO: 10.9 FL (ref 8.5–12.5)
POTASSIUM BLD-SCNC: 3.8 MMOL/L (ref 3.5–5)
RBC # BLD AUTO: 4 MILL/UL (ref 3.8–5.4)
RIBOTYPE 027/NAP1/BI: ABNORMAL
SODIUM SERPL-SCNC: 141 MMOL/L (ref 136–145)
WBC: 5.8 THOU/UL (ref 4–11)

## 2019-12-04 ENCOUNTER — COMMUNICATION - HEALTHEAST (OUTPATIENT)
Dept: GERIATRICS | Facility: CLINIC | Age: 84
End: 2019-12-04

## 2019-12-04 ENCOUNTER — RECORDS - HEALTHEAST (OUTPATIENT)
Dept: LAB | Facility: CLINIC | Age: 84
End: 2019-12-04

## 2019-12-04 LAB
ANION GAP SERPL CALCULATED.3IONS-SCNC: 11 MMOL/L (ref 5–18)
BUN SERPL-MCNC: 64 MG/DL (ref 8–28)
C REACTIVE PROTEIN LHE: 6.5 MG/DL (ref 0–0.8)
CALCIUM SERPL-MCNC: 8.1 MG/DL (ref 8.5–10.5)
CHLORIDE BLD-SCNC: 120 MMOL/L (ref 98–107)
CO2 SERPL-SCNC: 10 MMOL/L (ref 22–31)
CREAT SERPL-MCNC: 2.35 MG/DL (ref 0.6–1.1)
ERYTHROCYTE [DISTWIDTH] IN BLOOD BY AUTOMATED COUNT: 14.1 % (ref 11–14.5)
GFR SERPL CREATININE-BSD FRML MDRD: 19 ML/MIN/1.73M2
GLUCOSE BLD-MCNC: 68 MG/DL (ref 70–125)
HCT VFR BLD AUTO: 36.6 % (ref 35–47)
HGB BLD-MCNC: 11.6 G/DL (ref 12–16)
MCH RBC QN AUTO: 32.7 PG (ref 27–34)
MCHC RBC AUTO-ENTMCNC: 31.7 G/DL (ref 32–36)
MCV RBC AUTO: 103 FL (ref 80–100)
PLATELET # BLD AUTO: 163 THOU/UL (ref 140–440)
PMV BLD AUTO: 10.5 FL (ref 8.5–12.5)
POTASSIUM BLD-SCNC: 3.6 MMOL/L (ref 3.5–5)
RBC # BLD AUTO: 3.55 MILL/UL (ref 3.8–5.4)
SODIUM SERPL-SCNC: 141 MMOL/L (ref 136–145)
WBC: 3.8 THOU/UL (ref 4–11)

## 2019-12-05 ENCOUNTER — OFFICE VISIT - HEALTHEAST (OUTPATIENT)
Dept: GERIATRICS | Facility: CLINIC | Age: 84
End: 2019-12-05

## 2019-12-05 DIAGNOSIS — A04.72 C. DIFFICILE COLITIS: ICD-10-CM

## 2019-12-05 DIAGNOSIS — R53.81 PHYSICAL DECONDITIONING: ICD-10-CM

## 2019-12-05 DIAGNOSIS — S91.109D OPEN WOUND OF TOE, SUBSEQUENT ENCOUNTER: ICD-10-CM

## 2019-12-05 DIAGNOSIS — N17.9 AKI (ACUTE KIDNEY INJURY) (H): ICD-10-CM

## 2019-12-05 DIAGNOSIS — I50.32 CHRONIC DIASTOLIC CONGESTIVE HEART FAILURE (H): ICD-10-CM

## 2019-12-05 DIAGNOSIS — E86.1 HYPOTENSION DUE TO HYPOVOLEMIA: ICD-10-CM

## 2019-12-05 DIAGNOSIS — I25.10 ATHEROSCLEROSIS OF CORONARY ARTERY OF NATIVE HEART WITHOUT ANGINA PECTORIS, UNSPECIFIED VESSEL OR LESION TYPE: ICD-10-CM

## 2019-12-05 DIAGNOSIS — I25.5 ISCHEMIC CARDIOMYOPATHY: ICD-10-CM

## 2019-12-05 DIAGNOSIS — G31.84 MCI (MILD COGNITIVE IMPAIRMENT): ICD-10-CM

## 2019-12-08 RX ORDER — METOPROLOL SUCCINATE 50 MG/1
50 TABLET, EXTENDED RELEASE ORAL DAILY
Status: SHIPPED | COMMUNITY
Start: 2019-12-08

## 2019-12-09 ENCOUNTER — RECORDS - HEALTHEAST (OUTPATIENT)
Dept: LAB | Facility: CLINIC | Age: 84
End: 2019-12-09

## 2019-12-09 ENCOUNTER — COMMUNICATION - HEALTHEAST (OUTPATIENT)
Dept: GERIATRICS | Facility: CLINIC | Age: 84
End: 2019-12-09

## 2019-12-09 LAB
ANION GAP SERPL CALCULATED.3IONS-SCNC: 7 MMOL/L (ref 5–18)
BUN SERPL-MCNC: 24 MG/DL (ref 8–28)
CALCIUM SERPL-MCNC: 7.7 MG/DL (ref 8.5–10.5)
CHLORIDE BLD-SCNC: 117 MMOL/L (ref 98–107)
CO2 SERPL-SCNC: 12 MMOL/L (ref 22–31)
CREAT SERPL-MCNC: 1.09 MG/DL (ref 0.6–1.1)
GFR SERPL CREATININE-BSD FRML MDRD: 47 ML/MIN/1.73M2
GLUCOSE BLD-MCNC: 78 MG/DL (ref 70–125)
POTASSIUM BLD-SCNC: 3.3 MMOL/L (ref 3.5–5)
SODIUM SERPL-SCNC: 136 MMOL/L (ref 136–145)

## 2019-12-10 ENCOUNTER — COMMUNICATION - HEALTHEAST (OUTPATIENT)
Dept: GERIATRICS | Facility: CLINIC | Age: 84
End: 2019-12-10

## 2019-12-10 ENCOUNTER — HOME CARE/HOSPICE - HEALTHEAST (OUTPATIENT)
Dept: HOSPICE | Facility: HOSPICE | Age: 84
End: 2019-12-10

## 2019-12-10 ENCOUNTER — OFFICE VISIT - HEALTHEAST (OUTPATIENT)
Dept: GERIATRICS | Facility: CLINIC | Age: 84
End: 2019-12-10

## 2019-12-10 DIAGNOSIS — I50.32 CHRONIC DIASTOLIC CONGESTIVE HEART FAILURE (H): ICD-10-CM

## 2019-12-10 DIAGNOSIS — K52.9 COLITIS: ICD-10-CM

## 2019-12-10 DIAGNOSIS — N17.9 AKI (ACUTE KIDNEY INJURY) (H): ICD-10-CM

## 2019-12-10 DIAGNOSIS — A04.72 C. DIFFICILE COLITIS: ICD-10-CM

## 2019-12-10 DIAGNOSIS — G31.84 MCI (MILD COGNITIVE IMPAIRMENT): ICD-10-CM

## 2019-12-10 DIAGNOSIS — S91.109D OPEN WOUND OF TOE, SUBSEQUENT ENCOUNTER: ICD-10-CM

## 2019-12-10 DIAGNOSIS — R53.81 PHYSICAL DECONDITIONING: ICD-10-CM

## 2019-12-10 DIAGNOSIS — E86.1 HYPOTENSION DUE TO HYPOVOLEMIA: ICD-10-CM

## 2019-12-11 ENCOUNTER — HOME CARE/HOSPICE - HEALTHEAST (OUTPATIENT)
Dept: HOSPICE | Facility: HOSPICE | Age: 84
End: 2019-12-11

## 2019-12-16 ENCOUNTER — HOME CARE/HOSPICE - HEALTHEAST (OUTPATIENT)
Dept: HOSPICE | Facility: HOSPICE | Age: 84
End: 2019-12-16

## 2019-12-19 ENCOUNTER — RECORDS - HEALTHEAST (OUTPATIENT)
Dept: LAB | Facility: CLINIC | Age: 84
End: 2019-12-19

## 2019-12-19 ENCOUNTER — AMBULATORY - HEALTHEAST (OUTPATIENT)
Dept: GERIATRICS | Facility: CLINIC | Age: 84
End: 2019-12-19

## 2019-12-19 LAB
ANION GAP SERPL CALCULATED.3IONS-SCNC: 6 MMOL/L (ref 5–18)
BUN SERPL-MCNC: 17 MG/DL (ref 8–28)
CALCIUM SERPL-MCNC: 8.1 MG/DL (ref 8.5–10.5)
CHLORIDE BLD-SCNC: 114 MMOL/L (ref 98–107)
CO2 SERPL-SCNC: 17 MMOL/L (ref 22–31)
CREAT SERPL-MCNC: 0.89 MG/DL (ref 0.6–1.1)
ERYTHROCYTE [DISTWIDTH] IN BLOOD BY AUTOMATED COUNT: 13.7 % (ref 11–14.5)
GFR SERPL CREATININE-BSD FRML MDRD: 59 ML/MIN/1.73M2
GLUCOSE BLD-MCNC: 58 MG/DL (ref 70–125)
HCT VFR BLD AUTO: 27.7 % (ref 35–47)
HGB BLD-MCNC: 8.8 G/DL (ref 12–16)
MCH RBC QN AUTO: 32.5 PG (ref 27–34)
MCHC RBC AUTO-ENTMCNC: 31.8 G/DL (ref 32–36)
MCV RBC AUTO: 102 FL (ref 80–100)
PLATELET # BLD AUTO: 224 THOU/UL (ref 140–440)
PMV BLD AUTO: 10.6 FL (ref 8.5–12.5)
POTASSIUM BLD-SCNC: 4.6 MMOL/L (ref 3.5–5)
RBC # BLD AUTO: 2.71 MILL/UL (ref 3.8–5.4)
SODIUM SERPL-SCNC: 137 MMOL/L (ref 136–145)
URATE SERPL-MCNC: 7.9 MG/DL (ref 2–7.5)
WBC: 5 THOU/UL (ref 4–11)

## 2019-12-20 ENCOUNTER — RECORDS - HEALTHEAST (OUTPATIENT)
Dept: LAB | Facility: CLINIC | Age: 84
End: 2019-12-20

## 2019-12-20 LAB
FLUAV AG SPEC QL IA: NORMAL
FLUBV AG SPEC QL IA: NORMAL

## 2020-01-20 ENCOUNTER — RECORDS - HEALTHEAST (OUTPATIENT)
Dept: LAB | Facility: CLINIC | Age: 85
End: 2020-01-20

## 2020-01-20 LAB
ANION GAP SERPL CALCULATED.3IONS-SCNC: 11 MMOL/L (ref 5–18)
BUN SERPL-MCNC: 20 MG/DL (ref 8–28)
CALCIUM SERPL-MCNC: 8.5 MG/DL (ref 8.5–10.5)
CHLORIDE BLD-SCNC: 109 MMOL/L (ref 98–107)
CO2 SERPL-SCNC: 22 MMOL/L (ref 22–31)
CREAT SERPL-MCNC: 1 MG/DL (ref 0.6–1.1)
GFR SERPL CREATININE-BSD FRML MDRD: 52 ML/MIN/1.73M2
GLUCOSE BLD-MCNC: 78 MG/DL (ref 70–125)
POTASSIUM BLD-SCNC: 3.6 MMOL/L (ref 3.5–5)
SODIUM SERPL-SCNC: 142 MMOL/L (ref 136–145)

## 2020-02-08 ENCOUNTER — RECORDS - HEALTHEAST (OUTPATIENT)
Dept: LAB | Facility: CLINIC | Age: 85
End: 2020-02-08

## 2020-02-08 LAB
C DIFF TOX B STL QL: POSITIVE
RIBOTYPE 027/NAP1/BI: ABNORMAL

## 2020-03-07 ENCOUNTER — RECORDS - HEALTHEAST (OUTPATIENT)
Dept: LAB | Facility: CLINIC | Age: 85
End: 2020-03-07

## 2020-03-07 LAB
ROTAVIRUS ANTIGEN: NORMAL
SHIGA TOXIN 1: NEGATIVE
SHIGA TOXIN 2: NEGATIVE

## 2020-03-09 LAB — BACTERIA SPEC CULT: NORMAL

## 2021-05-25 ENCOUNTER — RECORDS - HEALTHEAST (OUTPATIENT)
Dept: ADMINISTRATIVE | Facility: CLINIC | Age: 86
End: 2021-05-25

## 2021-05-26 ENCOUNTER — RECORDS - HEALTHEAST (OUTPATIENT)
Dept: ADMINISTRATIVE | Facility: CLINIC | Age: 86
End: 2021-05-26

## 2021-05-27 ENCOUNTER — RECORDS - HEALTHEAST (OUTPATIENT)
Dept: ADMINISTRATIVE | Facility: CLINIC | Age: 86
End: 2021-05-27

## 2021-05-29 ENCOUNTER — RECORDS - HEALTHEAST (OUTPATIENT)
Dept: ADMINISTRATIVE | Facility: CLINIC | Age: 86
End: 2021-05-29

## 2021-05-30 ENCOUNTER — RECORDS - HEALTHEAST (OUTPATIENT)
Dept: ADMINISTRATIVE | Facility: CLINIC | Age: 86
End: 2021-05-30

## 2021-05-31 ENCOUNTER — RECORDS - HEALTHEAST (OUTPATIENT)
Dept: ADMINISTRATIVE | Facility: CLINIC | Age: 86
End: 2021-05-31

## 2021-06-01 ENCOUNTER — RECORDS - HEALTHEAST (OUTPATIENT)
Dept: ADMINISTRATIVE | Facility: CLINIC | Age: 86
End: 2021-06-01

## 2021-06-02 ENCOUNTER — RECORDS - HEALTHEAST (OUTPATIENT)
Dept: ADMINISTRATIVE | Facility: CLINIC | Age: 86
End: 2021-06-02

## 2021-06-03 ENCOUNTER — RECORDS - HEALTHEAST (OUTPATIENT)
Dept: ADMINISTRATIVE | Facility: CLINIC | Age: 86
End: 2021-06-03

## 2021-06-03 VITALS
HEART RATE: 69 BPM | WEIGHT: 122.7 LBS | TEMPERATURE: 98.6 F | RESPIRATION RATE: 18 BRPM | DIASTOLIC BLOOD PRESSURE: 77 MMHG | SYSTOLIC BLOOD PRESSURE: 145 MMHG | BODY MASS INDEX: 22.44 KG/M2

## 2021-06-03 VITALS
WEIGHT: 111 LBS | RESPIRATION RATE: 18 BRPM | BODY MASS INDEX: 20.3 KG/M2 | HEART RATE: 72 BPM | OXYGEN SATURATION: 96 % | TEMPERATURE: 98.4 F | SYSTOLIC BLOOD PRESSURE: 119 MMHG | DIASTOLIC BLOOD PRESSURE: 80 MMHG

## 2021-06-03 VITALS
OXYGEN SATURATION: 100 % | DIASTOLIC BLOOD PRESSURE: 78 MMHG | HEART RATE: 77 BPM | RESPIRATION RATE: 18 BRPM | WEIGHT: 114 LBS | TEMPERATURE: 97.7 F | SYSTOLIC BLOOD PRESSURE: 117 MMHG | BODY MASS INDEX: 20.85 KG/M2

## 2021-06-03 VITALS
HEART RATE: 96 BPM | OXYGEN SATURATION: 94 % | HEART RATE: 96 BPM | OXYGEN SATURATION: 94 % | SYSTOLIC BLOOD PRESSURE: 135 MMHG | RESPIRATION RATE: 18 BRPM | TEMPERATURE: 99.5 F | WEIGHT: 111.2 LBS | BODY MASS INDEX: 20.34 KG/M2 | BODY MASS INDEX: 20.34 KG/M2 | RESPIRATION RATE: 18 BRPM | DIASTOLIC BLOOD PRESSURE: 91 MMHG | TEMPERATURE: 99.5 F | DIASTOLIC BLOOD PRESSURE: 91 MMHG | WEIGHT: 111.2 LBS | SYSTOLIC BLOOD PRESSURE: 135 MMHG

## 2021-06-03 VITALS
OXYGEN SATURATION: 95 % | WEIGHT: 120.8 LBS | BODY MASS INDEX: 22.44 KG/M2 | HEART RATE: 60 BPM | RESPIRATION RATE: 18 BRPM | OXYGEN SATURATION: 96 % | RESPIRATION RATE: 18 BRPM | SYSTOLIC BLOOD PRESSURE: 142 MMHG | SYSTOLIC BLOOD PRESSURE: 145 MMHG | DIASTOLIC BLOOD PRESSURE: 86 MMHG | TEMPERATURE: 98.6 F | DIASTOLIC BLOOD PRESSURE: 77 MMHG | HEART RATE: 69 BPM | BODY MASS INDEX: 22.09 KG/M2 | WEIGHT: 122.7 LBS | TEMPERATURE: 98.6 F

## 2021-06-03 VITALS
SYSTOLIC BLOOD PRESSURE: 165 MMHG | DIASTOLIC BLOOD PRESSURE: 74 MMHG | HEART RATE: 70 BPM | RESPIRATION RATE: 18 BRPM | BODY MASS INDEX: 22.13 KG/M2 | WEIGHT: 121 LBS | TEMPERATURE: 98.5 F | OXYGEN SATURATION: 95 %

## 2021-06-03 VITALS
HEART RATE: 53 BPM | RESPIRATION RATE: 18 BRPM | DIASTOLIC BLOOD PRESSURE: 80 MMHG | TEMPERATURE: 98.5 F | WEIGHT: 115 LBS | SYSTOLIC BLOOD PRESSURE: 150 MMHG | OXYGEN SATURATION: 97 % | BODY MASS INDEX: 21.03 KG/M2

## 2021-06-03 VITALS
TEMPERATURE: 97.4 F | OXYGEN SATURATION: 97 % | BODY MASS INDEX: 20.89 KG/M2 | DIASTOLIC BLOOD PRESSURE: 85 MMHG | HEART RATE: 61 BPM | WEIGHT: 114.2 LBS | RESPIRATION RATE: 18 BRPM | SYSTOLIC BLOOD PRESSURE: 149 MMHG

## 2021-06-03 VITALS
TEMPERATURE: 97.6 F | TEMPERATURE: 97.6 F | RESPIRATION RATE: 20 BRPM | BODY MASS INDEX: 20.1 KG/M2 | SYSTOLIC BLOOD PRESSURE: 147 MMHG | WEIGHT: 109.9 LBS | OXYGEN SATURATION: 99 % | SYSTOLIC BLOOD PRESSURE: 147 MMHG | DIASTOLIC BLOOD PRESSURE: 89 MMHG | WEIGHT: 109.9 LBS | RESPIRATION RATE: 20 BRPM | DIASTOLIC BLOOD PRESSURE: 89 MMHG | HEART RATE: 64 BPM | BODY MASS INDEX: 20.1 KG/M2 | OXYGEN SATURATION: 99 % | HEART RATE: 64 BPM

## 2021-06-03 VITALS
OXYGEN SATURATION: 94 % | RESPIRATION RATE: 20 BRPM | TEMPERATURE: 97.1 F | DIASTOLIC BLOOD PRESSURE: 72 MMHG | HEART RATE: 98 BPM | BODY MASS INDEX: 19.2 KG/M2 | WEIGHT: 105 LBS | SYSTOLIC BLOOD PRESSURE: 103 MMHG

## 2021-06-03 NOTE — PROGRESS NOTES
[facility]    Facility:   [facility] [[id number]]   Code Status: DNR/DNI and POLST AVAILABLE      CHIEF COMPLAINT/REASON FOR VISIT:  Chief Complaint   Patient presents with     Review Of Multiple Medical Conditions       HISTORY:      HPI: Janice is a 89 y.o. female who was admitted to [facility] from 11/1/19-11/7/19 for colitis.  Janice  has a past medical history of Anemia, Arthritis, CHF (congestive heart failure) (H), Coronary artery disease, Erosive gastritis, History of non-ST elevation myocardial infarction (NSTEMI), History of transfusion, Hyperlipidemia, and Hypertension.  Janice has multiple dark stools prior to hospitalization with resulting dehydration and syncopal episode at home with fall.  She was treated with IV fluids, ciprofloxacin and Flagyl for infectious colitis with CT abdomen showing colitis extending from mid transverse colon through the rectum, large amount of stool in the rectum and borderline dilated small bowel loops with air-fluid levels.  Her diarrhea has since improved with loperamide.  She also had SANTHOSH upon hospital admission improved with IV hydration.  Janice was also started on prednisone during her inpatient stay for suspected gout with erythema on her bilateral 1st metatarsals.  She is currently at [facility] s/p acute rehabilitation.      Today Ms. Gaspar is being evaluated for a review of multiple medical conditions.  ROS is limited due to patient history of cognitive impairment.  Her CRP level today was improved at 1.6 from 4.6 on 11/12/19 with no pain in her bilateral feet at this visit taking prednisone and colchicine for acute gout treatment.  Her hemoglobin was improved from 8.5 on 11/11/19 to 10.1 today taking ferrous gluconate for iron supplementation.  Her potassium was low today at 3.2 and she is not currently taking any potassium supplementation.  She has been working with therapy and continues to have confusion  limiting her participation according to notes.  The patient denied lightheadedness, dizziness, breathing difficulty, chest pain, palpitations, constipation, urinary symptoms, numbness or tingling in extremities, and pain.  Nursing staff denied any new concerns for the patient at this time.      Past Medical History:   Diagnosis Date     Anemia      Arthritis      CHF (congestive heart failure) (H)     Acute, systolic     Coronary artery disease      Erosive gastritis     History of     History of non-ST elevation myocardial infarction (NSTEMI)      History of transfusion      Hyperlipidemia      Hypertension     Essential             History reviewed. No pertinent family history.  Social History     Socioeconomic History     Marital status:      Spouse name: None     Number of children: None     Years of education: None     Highest education level: None   Occupational History     None   Social Needs     Financial resource strain: None     Food insecurity:     Worry: None     Inability: None     Transportation needs:     Medical: None     Non-medical: None   Tobacco Use     Smoking status: Never Smoker     Smokeless tobacco: Never Used   Substance and Sexual Activity     Alcohol use: Yes     Alcohol/week: 7.0 standard drinks     Types: 7 Glasses of wine per week     Drug use: No     Sexual activity: None   Lifestyle     Physical activity:     Days per week: None     Minutes per session: None     Stress: None   Relationships     Social connections:     Talks on phone: None     Gets together: None     Attends Synagogue service: None     Active member of club or organization: None     Attends meetings of clubs or organizations: None     Relationship status: None     Intimate partner violence:     Fear of current or ex partner: None     Emotionally abused: None     Physically abused: None     Forced sexual activity: None   Other Topics Concern     Incontinent Not Asked     Toileting independently Not Asked      Cognitive impairment Not Asked     Vision impairment Not Asked     Hearing impairment Not Asked     Dentures Not Asked   Social History Narrative     None       REVIEW OF SYSTEM:  Pertinent items are noted in HPI.  A 12 point comprehensive review of systems was negative except as noted.    PHYSICAL EXAM:     General Appearance:    Alert, cooperative, no distress, appears stated age; frail   Head:    Normocephalic, without obvious abnormality, atraumatic   Eyes:    PERRL, conjunctiva/corneas clear, both eyes; wears glasses   Ears:    Normal external ear canals, both ears   Nose:   Nares normal, septum midline, mucosa normal, no drainage    or sinus tenderness   Throat:   Lips, mucosa, and tongue normal; teeth and gums normal   Neck:   Supple, symmetrical, trachea midline, no adenopathy;     thyroid:  no enlargement/tenderness/nodules; no carotid    bruit or JVD   Back:     Symmetric, no curvature, ROM normal, no CVA tenderness   Lungs:     Clear to auscultation bilaterally, respirations unlabored   Chest Wall:    No tenderness or deformity    Heart:    Regular rate and rhythm, S1 and S2 normal, no murmur, rub   or gallop   Abdomen:     Soft, non-tender, bowel sounds active all four quadrants,     no masses, no organomegaly   Extremities:   Extremities normal, atraumatic, no cyanosis; BLE moderate nonpitting edema; severe erythema and tophi on bilateral great toes hot to touch; tophi on left third toe scabbed over   Pulses:   2+ and symmetric all extremities   Skin:   Skin color, texture, turgor normal, no rashes or lesions   Neurologic:   Oriented to person; forgetful at times; exhibits logical thought processes; generalized weakness         LABS:     Check potassium level on 11/18/19.    ASSESSMENT:      ICD-10-CM    1. Physical deconditioning R53.81    2. Anemia, unspecified type D64.9    3. Hypokalemia E87.6    4. Acute gout of foot, unspecified cause, unspecified laterality M10.9        PLAN:      Physical  deconditioning  -PT/OT as directed  -Discharge from facility per therapy recommendations  -Discharge home pending at this time    Gout  Lab Results   Component Value Date    CRP 1.6 (H) 11/15/2019   -Continue prednisone 40 mg daily x 5 days  -Continue Tylenol from 1 g three times a day to four times a day  -Continue diclofenac 1% gel from two times a day to three times a day  -Continue Scheduled colchicine 0.6 mg tab 2 tabs in AM and 1 tab in PM  -Encouraged patient to utilize cold therapy three times a day and prn  -Encouraged patient to utilize integrative therapies such as distraction and deep breathing for pain management  -Notify provider if patient has new or worsening pain      Anemia  Lab Results   Component Value Date    HGB 10.1 (L) 11/15/2019   -Continue ferrous gluconate 324 mg daily    Hypokalemia  -START potassium chloride 10 meq daily  -Check potassium level on 11/18/19    Otherwise continue current care plan for all other chronic medical conditions, as they are stable. Encouraged patient to engage in healthy lifestyle behaviors such as engaging in social activities, exercising (PT/OT), eating well, and following care plan. Follow up for routine check-up, or sooner if needed. Will continue to monitor patient and work with nursing staff collaboratively to work toward positive patient outcomes.     Electronically signed by: Mansi Fitzpatrick CNP

## 2021-06-03 NOTE — PROGRESS NOTES
[facility]    Facility:   [facility] [[id number]]   Code Status: DNR/DNI and POLST AVAILABLE      CHIEF COMPLAINT/REASON FOR VISIT:  Chief Complaint   Patient presents with     Review Of Multiple Medical Conditions       HISTORY:      HPI: Janice is a 89 y.o. female who was admitted to [facility] from 11/1/19-11/7/19 for colitis.  Janice  has a past medical history of Anemia, Arthritis, CHF (congestive heart failure) (H), Coronary artery disease, Erosive gastritis, History of non-ST elevation myocardial infarction (NSTEMI), History of transfusion, Hyperlipidemia, and Hypertension.  Janice has multiple dark stools prior to hospitalization with resulting dehydration and syncopal episode at home with fall.  She was treated with IV fluids, ciprofloxacin and Flagyl for infectious colitis with CT abdomen showing colitis extending from mid transverse colon through the rectum, large amount of stool in the rectum and borderline dilated small bowel loops with air-fluid levels.  Her diarrhea has since improved with loperamide.  She also had SANTHOSH upon hospital admission improved with IV hydration.  Janice was also started on prednisone during her inpatient stay for suspected gout with erythema on her bilateral 1st metatarsals.  She is currently at [facility] s/p acute rehabilitation.      Today Ms. Gaspar is being evaluated for a review of multiple medical conditions.  Her main concern at this visit is bilateral foot pain described as sharp, intermittent rated at 5-6/10.  She is currently taking prednisone, Tylenol, and diclofenac 1% gel for pain management at this time.  She denied diarrhea at this time.  Her blood pressure has been well-controlled with amlodipine, furosemide, metoprolol, and lisinopril with -140s since TCU admission.  She has not started working with therapy yet at this time.  The patient denied lightheadedness, dizziness, breathing difficulty, chest  pain, palpitations, constipation, urinary symptoms, and numbness or tingling in extremities.  Nursing staff denied any new concerns for the patient at this time.        Past Medical History:   Diagnosis Date     Anemia      Arthritis      CHF (congestive heart failure) (H)     Acute, systolic     Coronary artery disease      Erosive gastritis     History of     History of non-ST elevation myocardial infarction (NSTEMI)      History of transfusion      Hyperlipidemia      Hypertension     Essential             No family history on file.  Social History     Socioeconomic History     Marital status:      Spouse name: Not on file     Number of children: Not on file     Years of education: Not on file     Highest education level: Not on file   Occupational History     Not on file   Social Needs     Financial resource strain: Not on file     Food insecurity:     Worry: Not on file     Inability: Not on file     Transportation needs:     Medical: Not on file     Non-medical: Not on file   Tobacco Use     Smoking status: Never Smoker     Smokeless tobacco: Never Used   Substance and Sexual Activity     Alcohol use: Yes     Alcohol/week: 7.0 standard drinks     Types: 7 Glasses of wine per week     Drug use: No     Sexual activity: Not on file   Lifestyle     Physical activity:     Days per week: Not on file     Minutes per session: Not on file     Stress: Not on file   Relationships     Social connections:     Talks on phone: Not on file     Gets together: Not on file     Attends Confucianist service: Not on file     Active member of club or organization: Not on file     Attends meetings of clubs or organizations: Not on file     Relationship status: Not on file     Intimate partner violence:     Fear of current or ex partner: Not on file     Emotionally abused: Not on file     Physically abused: Not on file     Forced sexual activity: Not on file   Other Topics Concern     Not on file   Social History Narrative     Not  on file       REVIEW OF SYSTEM:  Pertinent items are noted in HPI.  A 12 point comprehensive review of systems was negative except as noted.    PHYSICAL EXAM:     General Appearance:    Alert, cooperative, no distress, appears stated age; frail   Head:    Normocephalic, without obvious abnormality, atraumatic   Eyes:    PERRL, conjunctiva/corneas clear, both eyes   Ears:    Normal external ear canals, both ears   Nose:   Nares normal, septum midline, mucosa normal, no drainage    or sinus tenderness   Throat:   Lips, mucosa, and tongue normal; teeth and gums normal   Neck:   Supple, symmetrical, trachea midline, no adenopathy;     thyroid:  no enlargement/tenderness/nodules; no carotid    bruit or JVD   Back:     Symmetric, no curvature, ROM normal, no CVA tenderness   Lungs:     Clear to auscultation bilaterally, respirations unlabored   Chest Wall:    No tenderness or deformity    Heart:    Regular rate and rhythm, S1 and S2 normal, no murmur, rub   or gallop   Abdomen:     Soft, non-tender, bowel sounds active all four quadrants,     no masses, no organomegaly   Extremities:   Extremities normal, atraumatic, no cyanosis or edema; moderate erythema and tophi on bilateral great toes hot to touch   Pulses:   2+ and symmetric all extremities   Skin:   Skin color, texture, turgor normal, no rashes or lesions   Neurologic:   Oriented to person; forgetful at times; exhibits logical thought processes; generalized weakness         LABS:     Ordered BMP, Mg, and CBC on 11/11/19 for monitoring of chronic medical conditions.    ASSESSMENT:      ICD-10-CM    1. Physical deconditioning R53.81    2. Acute gout involving toe, unspecified cause, unspecified laterality M10.9    3. Foot pain, bilateral M79.671     M79.672    4. SANTHOSH (acute kidney injury) (H) N17.9    5. Colitis K52.9    6. Essential hypertension I10    7. Atherosclerosis of coronary artery of native heart without angina pectoris, unspecified vessel or lesion type  I25.10    8. Ischemic cardiomyopathy I25.5        PLAN:      Physical deconditioning  -PT/OT as directed  -Discharge from facility per therapy recommendations  -Discharge home pending at this time    Gout/Foot pain  -Continue prednisone 30 mg daily  -INCREASE Tylenol from 1 g three times a day to four times a day  -INCREASE diclofenac 1% gel from two times a day to three times a day  -Encouraged patient to utilize cold therapy three times a day and prn  -Encouraged patient to utilize integrative therapies such as distraction and deep breathing for pain management  -Notify provider if patient has new or worsening pain      Hypertension/CAD/Ischemic cardiomyopathy  -Encouraged cardiac diet, low sodium diet, exercise and stress reduction techniques.   -Emphasized importance of blood pressure control.   -Continue current medications as prescribed.   -Notify provider if pt's SBP<90 or >180 and DBP <50 or >100     SANTHOSH  -Check BMP and Mg on 11/11/19    Colitis  -Continue lactobacillus as prescribed  -Notify provider if patient has s/s of systemic infection including fever, chills, night sweats     Otherwise continue current care plan for all other chronic medical conditions, as they are stable. Encouraged patient to engage in healthy lifestyle behaviors such as engaging in social activities, exercising (PT/OT), eating well, and following care plan. Follow up for routine check-up, or sooner if needed. Will continue to monitor patient and work with nursing staff collaboratively to work toward positive patient outcomes.     Electronically signed by: Mansi Fitzpatrick CNP     Total floor/unit time was 60 minutes and 40 minutes was spent in counseling patient on gout management, pain management, and acute rehabilitation and coordination of care with nursing staff and SW.

## 2021-06-03 NOTE — PROGRESS NOTES
[facility]    Facility:   [facility] [[id number]]   Code Status: DNR/DNI and POLST AVAILABLE      CHIEF COMPLAINT/REASON FOR VISIT:  Chief Complaint   Patient presents with     Problem Visit     gout       HISTORY:      HPI: Janice is a 89 y.o. female who was admitted to [facility] from 11/1/19-11/7/19 for colitis.  Janice  has a past medical history of Anemia, Arthritis, CHF (congestive heart failure) (H), Coronary artery disease, Erosive gastritis, History of non-ST elevation myocardial infarction (NSTEMI), History of transfusion, Hyperlipidemia, and Hypertension.  Janice has multiple dark stools prior to hospitalization with resulting dehydration and syncopal episode at home with fall.  She was treated with IV fluids, ciprofloxacin and Flagyl for infectious colitis with CT abdomen showing colitis extending from mid transverse colon through the rectum, large amount of stool in the rectum and borderline dilated small bowel loops with air-fluid levels.  Her diarrhea has since improved with loperamide.  She also had SANTHOSH upon hospital admission improved with IV hydration.  Janice was also started on prednisone during her inpatient stay for suspected gout with erythema on her bilateral 1st metatarsals.  She is currently at [facility] s/p acute rehabilitation.      Today Ms. Gaspar is being evaluated for acute gout.  ROS is limited due to patient history of cognitive impairment.  Nursing staff noted that the patient continues to have pain in her BLE due to acute gout with moderate erythema and moderate nonpitting edema.  The patient had one of the tophi on her left third toe open up yesterday and it has since scabbed over.  Plan to discontinue kerlix dressing to left foot to minimize layers on foot due to pain.  Janice described her pain as sharp, intermittent pain in her bilateral feet rated 6/10 with palpation and ambulation.  She was started on prednisone 40 mg  yesterday increased from 30 mg daily started during her hospitalization.  She has been taking colchicine 1.2 mg tab and nursing staff reported that they have not been giving her second daily dose of 0.6 mg available for pain management.  She is also taking Tylenol and diclofenac gel for pain management.  She said that she has been able to tolerate therapy without any difficulty.  The patient denied lightheadedness, dizziness, breathing difficulty, chest pain, palpitations, constipation, urinary symptoms, and numbness or tingling in extremities.        Past Medical History:   Diagnosis Date     Anemia      Arthritis      CHF (congestive heart failure) (H)     Acute, systolic     Coronary artery disease      Erosive gastritis     History of     History of non-ST elevation myocardial infarction (NSTEMI)      History of transfusion      Hyperlipidemia      Hypertension     Essential             History reviewed. No pertinent family history.  Social History     Socioeconomic History     Marital status:      Spouse name: None     Number of children: None     Years of education: None     Highest education level: None   Occupational History     None   Social Needs     Financial resource strain: None     Food insecurity:     Worry: None     Inability: None     Transportation needs:     Medical: None     Non-medical: None   Tobacco Use     Smoking status: Never Smoker     Smokeless tobacco: Never Used   Substance and Sexual Activity     Alcohol use: Yes     Alcohol/week: 7.0 standard drinks     Types: 7 Glasses of wine per week     Drug use: No     Sexual activity: None   Lifestyle     Physical activity:     Days per week: None     Minutes per session: None     Stress: None   Relationships     Social connections:     Talks on phone: None     Gets together: None     Attends [employer]ian service: None     Active member of club or organization: None     Attends meetings of clubs or organizations: None     Relationship  status: None     Intimate partner violence:     Fear of current or ex partner: None     Emotionally abused: None     Physically abused: None     Forced sexual activity: None   Other Topics Concern     Incontinent Not Asked     Toileting independently Not Asked     Cognitive impairment Not Asked     Vision impairment Not Asked     Hearing impairment Not Asked     Dentures Not Asked   Social History Narrative     None       REVIEW OF SYSTEM:  Pertinent items are noted in HPI.  A 12 point comprehensive review of systems was negative except as noted.    PHYSICAL EXAM:     General Appearance:    Alert, cooperative, no distress, appears stated age; frail   Head:    Normocephalic, without obvious abnormality, atraumatic   Eyes:    PERRL, conjunctiva/corneas clear, both eyes   Ears:    Normal external ear canals, both ears   Nose:   Nares normal, septum midline, mucosa normal, no drainage    or sinus tenderness   Throat:   Lips, mucosa, and tongue normal; teeth and gums normal   Neck:   Supple, symmetrical, trachea midline, no adenopathy;     thyroid:  no enlargement/tenderness/nodules; no carotid    bruit or JVD   Back:     Symmetric, no curvature, ROM normal, no CVA tenderness   Lungs:     Clear to auscultation bilaterally, respirations unlabored   Chest Wall:    No tenderness or deformity    Heart:    Regular rate and rhythm, S1 and S2 normal, no murmur, rub   or gallop   Abdomen:     Soft, non-tender, bowel sounds active all four quadrants,     no masses, no organomegaly   Extremities:   Extremities normal, atraumatic, no cyanosis; BLE moderate nonpitting edema; severe erythema and tophi on bilateral great toes hot to touch; tophi on left third toe scabbed over   Pulses:   2+ and symmetric all extremities   Skin:   Skin color, texture, turgor normal, no rashes or lesions   Neurologic:   Oriented to person; forgetful at times; exhibits logical thought processes; generalized weakness         LABS:     Lab Results    Component Value Date    WBC 5.7 11/15/2019    HGB 10.1 (L) 11/15/2019    HCT 33.2 (L) 11/15/2019     11/15/2019    CHOL 138 01/11/2016    TRIG 83 01/11/2016    HDL 56 01/11/2016    ALT 28 11/15/2019    AST 33 11/15/2019     11/15/2019    K 3.2 (L) 11/15/2019     (H) 11/15/2019    CREATININE 0.92 11/15/2019    BUN 25 11/15/2019    CO2 19 (L) 11/15/2019    TSH 2.04 09/05/2017    INR 1.07 11/02/2019        ASSESSMENT:      ICD-10-CM    1. Physical deconditioning R53.81    2. Acute gout of foot, unspecified cause, unspecified laterality M10.9    3. Bilateral foot pain M79.671     M79.672        PLAN:      Physical deconditioning  -PT/OT as directed  -Discharge from facility per therapy recommendations  -Discharge home pending at this time    Gout/Foot pain  -Continue prednisone 40 mg daily x 5 days  -Continue Tylenol from 1 g three times a day to four times a day  -Continue diclofenac 1% gel from two times a day to three times a day  -START Scheduled colchicine 0.6 mg tab 2 tabs in AM and 1 tab in PM  -Check CRP on 11/15/19  -Encouraged patient to utilize cold therapy three times a day and prn  -Encouraged patient to utilize integrative therapies such as distraction and deep breathing for pain management  -Notify provider if patient has new or worsening pain      Otherwise continue current care plan for all other chronic medical conditions, as they are stable. Encouraged patient to engage in healthy lifestyle behaviors such as engaging in social activities, exercising (PT/OT), eating well, and following care plan. Follow up for routine check-up, or sooner if needed. Will continue to monitor patient and work with nursing staff collaboratively to work toward positive patient outcomes.     Electronically signed by: Mansi Fitzpatrick, JESUS

## 2021-06-03 NOTE — TELEPHONE ENCOUNTER
[facility] Nurse Triage Telephone Note      Provider: ERIKA Mathsi  Facility: [facility]     Facility Type: TCU    Caller: Cachorro  Call Back Number:  [phone]    Allergies: Patient has no known allergies.    Reason for call: BMP-CO2 8. CRP 5.0, WBC 5.8,  C.Diff +     Verbal Order/Direction given by Provider: Started PO Vanco today, & wrote to cont IV fluids. Check BMP, CBC, CRp 12/5.    Provider giving order: ERIKA Mathis    Verbal order given to: Cachorro Hilton RN

## 2021-06-03 NOTE — PROGRESS NOTES
[facility]    Facility:   [facility] [[id number]]   Code Status: DNR/DNI and POLST AVAILABLE      CHIEF COMPLAINT/REASON FOR VISIT:  Chief Complaint   Patient presents with     Review Of Multiple Medical Conditions       HISTORY:      HPI: Janice is a 89 y.o. female who was admitted to [facility] from 11/1/19-11/7/19 for colitis.  Janice  has a past medical history of Anemia, Arthritis, CHF (congestive heart failure) (H), Coronary artery disease, Erosive gastritis, History of non-ST elevation myocardial infarction (NSTEMI), History of transfusion, Hyperlipidemia, and Hypertension.  Janice has multiple dark stools prior to hospitalization with resulting dehydration and syncopal episode at home with fall.  She was treated with IV fluids, ciprofloxacin and Flagyl for infectious colitis with CT abdomen showing colitis extending from mid transverse colon through the rectum, large amount of stool in the rectum and borderline dilated small bowel loops with air-fluid levels.  Her diarrhea has since improved with loperamide.  She also had SANTHOSH upon hospital admission improved with IV hydration.  Janice was also started on prednisone during her inpatient stay for suspected gout with erythema on her bilateral 1st metatarsals.  She is currently at [facility] s/p acute rehabilitation.      Today Ms. Gaspar is being evaluated for a review of multiple medical conditions.  ROS is limited due to patient history of cognitive impairment.  Nursing staff reported that Janice did not get out of bed this morning for breakfast and she is reporting increased fatigue.  She denied pain at this visit taking prednisone and colchicine for gout exacerbation at this time.  Her last CRP was elevated at 4.6 and uric acid level at 8.6 on 11/12/19 taking 30 mg prednisone for treatment of gout.  Her last hemoglobin was low on 11/11/19 at 8.5 which could be contributing to her fatigue at this time taking  ferrous gluconate for supplementation.  She was agreeable to further monitoring of labs to evaluate her increased fatigue today.  The patient denied lightheadedness, dizziness, breathing difficulty, chest pain, palpitations, constipation, urinary symptoms, numbness or tingling in extremities, and pain.  Nursing staff noted that her daughter was called and said that the patient sleeps in a few days per week at baseline.        Past Medical History:   Diagnosis Date     Anemia      Arthritis      CHF (congestive heart failure) (H)     Acute, systolic     Coronary artery disease      Erosive gastritis     History of     History of non-ST elevation myocardial infarction (NSTEMI)      History of transfusion      Hyperlipidemia      Hypertension     Essential             History reviewed. No pertinent family history.  Social History     Socioeconomic History     Marital status:      Spouse name: None     Number of children: None     Years of education: None     Highest education level: None   Occupational History     None   Social Needs     Financial resource strain: None     Food insecurity:     Worry: None     Inability: None     Transportation needs:     Medical: None     Non-medical: None   Tobacco Use     Smoking status: Never Smoker     Smokeless tobacco: Never Used   Substance and Sexual Activity     Alcohol use: Yes     Alcohol/week: 7.0 standard drinks     Types: 7 Glasses of wine per week     Drug use: No     Sexual activity: None   Lifestyle     Physical activity:     Days per week: None     Minutes per session: None     Stress: None   Relationships     Social connections:     Talks on phone: None     Gets together: None     Attends Tenriism service: None     Active member of club or organization: None     Attends meetings of clubs or organizations: None     Relationship status: None     Intimate partner violence:     Fear of current or ex partner: None     Emotionally abused: None     Physically  abused: None     Forced sexual activity: None   Other Topics Concern     Incontinent Not Asked     Toileting independently Not Asked     Cognitive impairment Not Asked     Vision impairment Not Asked     Hearing impairment Not Asked     Dentures Not Asked   Social History Narrative     None       REVIEW OF SYSTEM:  Pertinent items are noted in HPI.  A 12 point comprehensive review of systems was negative except as noted.    PHYSICAL EXAM:     General Appearance:    Alert, cooperative, no distress, appears stated age; frail   Head:    Normocephalic, without obvious abnormality, atraumatic   Eyes:    PERRL, conjunctiva/corneas clear, both eyes   Ears:    Normal external ear canals, both ears   Nose:   Nares normal, septum midline, mucosa normal, no drainage    or sinus tenderness   Throat:   Lips, mucosa, and tongue normal; teeth and gums normal   Neck:   Supple, symmetrical, trachea midline, no adenopathy;     thyroid:  no enlargement/tenderness/nodules; no carotid    bruit or JVD   Back:     Symmetric, no curvature, ROM normal, no CVA tenderness   Lungs:     Clear to auscultation bilaterally, respirations unlabored   Chest Wall:    No tenderness or deformity    Heart:    Regular rate and rhythm, S1 and S2 normal, no murmur, rub   or gallop   Abdomen:     Soft, non-tender, bowel sounds active all four quadrants,     no masses, no organomegaly   Extremities:   Extremities normal, atraumatic, no cyanosis; BLE moderate nonpitting edema; severe erythema and tophi on bilateral great toes hot to touch; tophi on left third toe scabbed over   Pulses:   2+ and symmetric all extremities   Skin:   Skin color, texture, turgor normal, no rashes or lesions   Neurologic:   Oriented to person; forgetful at times; exhibits logical thought processes; generalized weakness         LABS:     Ordered CMP, CBC, and CRP for tomorrow 11/15/19.     ASSESSMENT:      ICD-10-CM    1. Anemia, unspecified type D64.9    2. Physical deconditioning  R53.81    3. Acute gout of foot, unspecified cause, unspecified laterality M10.9    4. Bilateral foot pain M79.671     M79.672    5. Fatigue due to excessive exertion, initial encounter T73.3XXA        PLAN:      Physical deconditioning  -PT/OT as directed  -Discharge from facility per therapy recommendations  -Discharge home pending at this time    Gout/Foot pain  -Continue prednisone 40 mg daily x 5 days  -Continue Tylenol from 1 g three times a day to four times a day  -Continue diclofenac 1% gel from two times a day to three times a day  -Continue Scheduled colchicine 0.6 mg tab 2 tabs in AM and 1 tab in PM  -Check CRP on 11/15/19  -Encouraged patient to utilize cold therapy three times a day and prn  -Encouraged patient to utilize integrative therapies such as distraction and deep breathing for pain management  -Notify provider if patient has new or worsening pain      Anemia  -Check CBC on 11/15/19    Fatigue  -Check CMP on 11/15/19    Otherwise continue current care plan for all other chronic medical conditions, as they are stable. Encouraged patient to engage in healthy lifestyle behaviors such as engaging in social activities, exercising (PT/OT), eating well, and following care plan. Follow up for routine check-up, or sooner if needed. Will continue to monitor patient and work with nursing staff collaboratively to work toward positive patient outcomes.     Electronically signed by: Mansi Fitzpatrick CNP

## 2021-06-03 NOTE — PROGRESS NOTES
[facility]  Initial MD Visit  11/12/19        Facility:   [facility] [[id number]]     Code Status: FULL CODE    Chief Complaint   Patient presents with     H & P       HPI:   Janice Gaspar is a 89 y.o. female who was seen at [facility] on 11/12/19 for an initial visit. Medical history is notable for CAD,  HTN, CHF, recurrent syncope and gastritis/GI bleed.  She was hospitalized at [facility] from 11/1/19 to 11/7/19 where she presented after a fall. She was found to have a non specific colitis and was treated with cipro/flagyl. Also started on a prednisone burst for acute gout of L first MTP. She was admitted to this facility for medical management and rehab.     Today, Ms. Gaspar is seen in her room. Her labs are back today with an elevated uric acid. She still has pain in her feet, but she says it is a bit better. Her toes and feet were wrapped in gauze and ACE wraps and I was unable to see them. No chest pain or dyspnea. No abdominal pain. No diarrhea or constipation. No black or dark stools. Discussed her lower Hgb, history of gastritis and the prednisone. Sleeping OK. Appetite is OK. Working with therapies.     ALLERGIES:  No Known Allergies    PAST MEDICAL HISTORY:  Past Medical History:   Diagnosis Date     Anemia      Arthritis      CHF (congestive heart failure) (H)     Acute, systolic     Coronary artery disease      Erosive gastritis     History of     History of non-ST elevation myocardial infarction (NSTEMI)      History of transfusion      Hyperlipidemia      Hypertension     Essential               PAST SURGICAL HISTORY:  Past Surgical History:   Procedure Laterality Date     ESOPHAGOGASTRODUODENOSCOPY N/A 1/16/2016    Procedure: ESOPHAGOGASTRODUODENOSCOPY;  Surgeon: Cachorro Cuellar MD;  Location: [facility];  Service:        FAMILY HISTORY:  Reviewed and non contributory    SOCIAL HISTORY:  Lives in assisted living     MEDICATIONS:  Current  Outpatient Medications   Medication Sig     acetaminophen (TYLENOL) 500 MG tablet Take 1,000 mg by mouth every 6 (six) hours.            amLODIPine (NORVASC) 5 MG tablet Take 5 mg by mouth daily.     aspirin 81 MG EC tablet TAKE 1 TABLET BY MOUTH ONCE DAILY (Patient taking differently: Take 81 mg by mouth daily.       )     atorvastatin (LIPITOR) 40 MG tablet TAKE 1 TABLET BY MOUTH ONCE DAILY (Patient taking differently: Take 40 mg by mouth at bedtime.       )     buPROPion (WELLBUTRIN SR) 100 MG 12 hr tablet Take 100 mg by mouth daily.     cholecalciferol, vitamin D3, 1,000 unit (25 mcg) tablet Take 1,000 Units by mouth daily.     diclofenac sodium (VOLTAREN) 1 % Gel Apply 8 g topically 2 (two) times a day. Apply to 4gm to  both hips and 4 gms to lower back           ferrous gluconate 324 mg (37.5 mg iron) Tab TAKE 1 TABLET BY MOUTH ONCE DAILY     folic acid (FOLVITE) 1 MG tablet Take 1 mg by mouth daily.     furosemide (LASIX) 20 MG tablet Take 20 mg by mouth daily.     Lactobacillus rhamnosus GG (CULTURELLE) 15 billion cell CpSP Take 1 capsule by mouth 2 (two) times a day with meals.     lisinopril (PRINIVIL,ZESTRIL) 5 MG tablet Take 7.5 mg by mouth daily.     metoprolol succinate (TOPROL-XL) 100 MG 24 hr tablet Take 100 mg by mouth daily.     mirtazapine (REMERON) 7.5 MG tablet Take 7.5 mg by mouth at bedtime.     pantoprazole (PROTONIX) 40 MG tablet Take 40 mg by mouth 2 (two) times a day.     predniSONE (DELTASONE) 20 MG tablet Take 40 mg by mouth daily x5 days. 11/13-11/17 .     ranitidine (ZANTAC) 300 MG tablet TAKE 1 TABLET BY MOUTH ONCE DAILY (Patient taking differently: Take 300 mg by mouth at bedtime.       )        ROS:  10 point ROS neg other than the symptoms noted above in the HPI.    PHYSICAL EXAM:  /77   Pulse 69   Temp 98.6  F (37  C)   Resp 18   Wt 122 lb 11.2 oz (55.7 kg)   BMI 22.44 kg/m     Gen: laying in bed, alert, cooperative and in no acute distress  HEENT: normocephalic;  oropharynx clear  Card: RRR, S1, S2, no murmurs  Resp: lungs clear to auscultation bilaterally, no crackles or wheezes  GI: abdomen soft, not-tender  MSK: normal muscle tone, no LE edema  Neuro: CX II-XII grossly in tact; ROM in all four extremities grossly in tact  Psych: alert and oriented to self; normal affect  Skin: dressing of gauze and ACE wraps c/d/i; I did not take down this afternoon    LABORATORY/IMAGING DATA:  Reviewed as per Epic    ASSESSMENT/PLAN:    Colitis  Completed course of cipro and metronidazole. Afebrile. Denies any diarrhea or abdominal pain.   -- follow clinically    Acute Gout of Left First MTP  Uric acid is elevated to 8.7 today.   -- analgesia with APAP 1000 mg q6h  -- started on prednisone 30 mg daily x5 days - last day tomorrow 11/13   -- will go ahead and increase to 40 mg daily starting tomorrow x5  additional days  -- started on colchicine yesterday    Fe Deficiency Anemia  Acute Anemia   Baseline Hgb 10-11. Hgb has drifted down to 8.5. History of gastritis and GI bleed. She's been on prednisone and now on colchicine. Denies any black or dark stools.   -- ferrous gluconate 324 mg daily  -- start pantoprazole 40 mg two times a day  -- repeat Hgb to ensure not spurious      CAD, Diastolic CHF (EF 60%), HTN, HLD  Mild Aortic Stenosis   SBPs 120s-140s. Weights stable at 123 lbs.   -- amlodipine 5 mg daily, ASA 81 mg daily, atorvastatin 40 mg daily, furosemide 20 mg daily, lisinopril 7.5 mg daily and metoprolol  mg d aily  -- follow BPs and adjust medications as needed    Cognitive Impairment  SBT 21/26 in the hospital. SLUMS 15/30 at [facility]. Lives in an AL facility  -- OT following for cog assessment     Depression / Anxiety  Mood and spirits good today  -- bupropion 100 mg daily and mirtazapine 7.5 mg at bedtime   -- supportive cares    Gastritis  History of associated GI bleed.   -- adding a two times a day PPI today as above  -- ranitidine 300 mg at bedtime     Falls  Physical  Deconditioning  In setting of hospitalization and underlying medical conditions  -- ongoing PT/OT    Electronically signed by: Jana Waller MD

## 2021-06-03 NOTE — PROGRESS NOTES
[facility]    Facility:   [facility] [[id number]]   Code Status: DNR/DNI and POLST AVAILABLE      CHIEF COMPLAINT/REASON FOR VISIT:  Chief Complaint   Patient presents with     Problem Visit     gout       HISTORY:      HPI: Janice is a 89 y.o. female who was admitted to [facility] from 11/1/19-11/7/19 for colitis.  Janice  has a past medical history of Anemia, Arthritis, CHF (congestive heart failure) (H), Coronary artery disease, Erosive gastritis, History of non-ST elevation myocardial infarction (NSTEMI), History of transfusion, Hyperlipidemia, and Hypertension.  Janice has multiple dark stools prior to hospitalization with resulting dehydration and syncopal episode at home with fall.  She was treated with IV fluids, ciprofloxacin and Flagyl for infectious colitis with CT abdomen showing colitis extending from mid transverse colon through the rectum, large amount of stool in the rectum and borderline dilated small bowel loops with air-fluid levels.  Her diarrhea has since improved with loperamide.  She also had SANTHOSH upon hospital admission improved with IV hydration.  Janice was also started on prednisone during her inpatient stay for suspected gout with erythema on her bilateral 1st metatarsals.  She is currently at [facility] s/p acute rehabilitation.      Today Ms. Gaspar is being evaluated for a review of multiple medical conditions.  ROS is limited due to patient history of cognitive impairment.  Janice reported that she feels fatigued today and refused therapy this morning.  Her CRP has returned to normal at 0.1 today from 2.2 on 11/21/19 finishing prednisone taper on Friday 11/29/19.  Janice denied pain at this time in her bilateral feet.  Nursing staff noted that her bilateral toe wounds have been healing with medihoney daily for wound care.  The patient denied lightheadedness, dizziness, breathing difficulty, chest pain, palpitations, constipation, urinary  symptoms, numbness or tingling in extremities, and pain. Nursing staff denied any new concerns for the patient at this time.      Past Medical History:   Diagnosis Date     Anemia      Arthritis      CHF (congestive heart failure) (H)     Acute, systolic     Coronary artery disease      Erosive gastritis     History of     History of non-ST elevation myocardial infarction (NSTEMI)      History of transfusion      Hyperlipidemia      Hypertension     Essential             No family history on file.  Social History     Socioeconomic History     Marital status:      Spouse name: Not on file     Number of children: Not on file     Years of education: Not on file     Highest education level: Not on file   Occupational History     Not on file   Social Needs     Financial resource strain: Not on file     Food insecurity:     Worry: Not on file     Inability: Not on file     Transportation needs:     Medical: Not on file     Non-medical: Not on file   Tobacco Use     Smoking status: Never Smoker     Smokeless tobacco: Never Used   Substance and Sexual Activity     Alcohol use: Yes     Alcohol/week: 7.0 standard drinks     Types: 7 Glasses of wine per week     Drug use: No     Sexual activity: Not on file   Lifestyle     Physical activity:     Days per week: Not on file     Minutes per session: Not on file     Stress: Not on file   Relationships     Social connections:     Talks on phone: Not on file     Gets together: Not on file     Attends Mandaen service: Not on file     Active member of club or organization: Not on file     Attends meetings of clubs or organizations: Not on file     Relationship status: Not on file     Intimate partner violence:     Fear of current or ex partner: Not on file     Emotionally abused: Not on file     Physically abused: Not on file     Forced sexual activity: Not on file   Other Topics Concern     Incontinent Not Asked     Toileting independently Not Asked     Cognitive impairment  Not Asked     Vision impairment Not Asked     Hearing impairment Not Asked     Dentures Not Asked   Social History Narrative     Not on file       REVIEW OF SYSTEM:  Pertinent items are noted in HPI.  A 12 point comprehensive review of systems was negative except as noted.    PHYSICAL EXAM:     General Appearance:    Alert, cooperative, no distress, appears stated age; frail   Head:    Normocephalic, without obvious abnormality, atraumatic   Eyes:    PERRL, conjunctiva/corneas clear, both eyes; wears glasses   Ears:    Normal external ear canals, both ears   Nose:   Nares normal, septum midline, mucosa normal, no drainage    or sinus tenderness   Throat:   Lips, mucosa, and tongue normal; teeth and gums normal   Neck:   Supple, symmetrical, trachea midline, no adenopathy;     thyroid:  no enlargement/tenderness/nodules; no carotid    bruit or JVD   Back:     Symmetric, no curvature, ROM normal, no CVA tenderness   Lungs:     Clear to auscultation bilaterally, respirations unlabored   Chest Wall:    No tenderness or deformity    Heart:    Regular rate and rhythm, S1 and S2 normal, no murmur, rub   or gallop   Abdomen:     Soft, non-tender, bowel sounds active all four quadrants,     no masses, no organomegaly   Extremities:   Extremities normal, atraumatic, no cyanosis; BLE moderate nonpitting edema; severe erythema and tophi on bilateral great toes; tophi opened up on left third and fourth toes and right second toe   Pulses:   2+ and symmetric all extremities   Skin:   Skin color, texture, turgor normal, no rashes or lesions   Neurologic:   Oriented to person; forgetful at times; exhibits logical thought processes; generalized weakness         LABS:     Lab Results   Component Value Date    WBC 5.7 11/15/2019    HGB 10.1 (L) 11/15/2019    HCT 33.2 (L) 11/15/2019     11/15/2019    CHOL 138 01/11/2016    TRIG 83 01/11/2016    HDL 56 01/11/2016    ALT 28 11/15/2019    AST 33 11/15/2019     11/21/2019    K  3.8 11/21/2019     (H) 11/21/2019    CREATININE 1.31 (H) 11/21/2019    BUN 23 11/21/2019    CO2 9 (LL) 11/21/2019    TSH 2.04 09/05/2017    INR 1.07 11/02/2019        ASSESSMENT:      ICD-10-CM    1. Acute gout of foot, unspecified cause, unspecified laterality M10.9    2. Physical deconditioning R53.81    3. Open wound of toe, subsequent encounter S91.109D        PLAN:      Physical deconditioning  -PT/OT as directed  -Discharge from facility per therapy recommendations  -Discharge home pending at this time    Gout  Lab Results   Component Value Date    CRP 0.1 11/25/2019   -Continue prednisone 40 mg x 2 days, 30 mg x 2 days, 20 mg x 2 days, and 10 mg x 2 days  -Continue Tylenol 500 mg Q4H PRN  -Continue Scheduled colchicine 0.6 mg tab 2 tabs in AM and 1 tab in PM  -Encouraged patient to utilize integrative therapies such as distraction and deep breathing for pain management  -Notify provider if patient has new or worsening pain      Multiple open toe wounds  -Continue medihoney to open toe wounds covered with telfa wrapped with kerlix and ace wraps  -Notify provider if patient has s/s of systemic infection including fever, chills, night sweats     Otherwise continue current care plan for all other chronic medical conditions, as they are stable. Encouraged patient to engage in healthy lifestyle behaviors such as engaging in social activities, exercising (PT/OT), eating well, and following care plan. Follow up for routine check-up, or sooner if needed. Will continue to monitor patient and work with nursing staff collaboratively to work toward positive patient outcomes.     Electronically signed by: Mansi Fitzpatrick, CNP

## 2021-06-03 NOTE — PROGRESS NOTES
[facility]    Facility:   [facility] [[id number]]   Code Status: DNR/DNI and POLST AVAILABLE      CHIEF COMPLAINT/REASON FOR VISIT:  Chief Complaint   Patient presents with     Review Of Multiple Medical Conditions       HISTORY:      HPI: Janice is a 89 y.o. female who was admitted to [facility] from 11/1/19-11/7/19 for colitis.  Janice  has a past medical history of Anemia, Arthritis, CHF (congestive heart failure) (H), Coronary artery disease, Erosive gastritis, History of non-ST elevation myocardial infarction (NSTEMI), History of transfusion, Hyperlipidemia, and Hypertension.  Janice has multiple dark stools prior to hospitalization with resulting dehydration and syncopal episode at home with fall.  She was treated with IV fluids, ciprofloxacin and Flagyl for infectious colitis with CT abdomen showing colitis extending from mid transverse colon through the rectum, large amount of stool in the rectum and borderline dilated small bowel loops with air-fluid levels.  Her diarrhea has since improved with loperamide.  She also had SANTHOSH upon hospital admission improved with IV hydration.  Janice was also started on prednisone during her inpatient stay for suspected gout with erythema on her bilateral 1st metatarsals.  She is currently at [facility] s/p acute rehabilitation.      Today Ms. Gaspar is being evaluated for a review of multiple medical conditions.  ROS is limited due to patient history of cognitive impairment.  Her pain in her bilateral feet has resolved at this time.  Her bilateral toe wounds are healing with decreased erythema and purulent drainage from tophi at this visit.  Her CRP was elevated from her previous level at 2.2 today after completing prednisone two days ago.  Her uric acid continues to increase at 10.1 today from 8.6 on 11/12/19.  She has been able to ambulate short distances with therapy but has been refusing often due to fatigue.  The  patient denied lightheadedness, dizziness, breathing difficulty, chest pain, palpitations, constipation, urinary symptoms, numbness or tingling in extremities, and pain.  Nursing staff denied any new concerns for the patient at this time.      Past Medical History:   Diagnosis Date     Anemia      Arthritis      CHF (congestive heart failure) (H)     Acute, systolic     Coronary artery disease      Erosive gastritis     History of     History of non-ST elevation myocardial infarction (NSTEMI)      History of transfusion      Hyperlipidemia      Hypertension     Essential             No family history on file.  Social History     Socioeconomic History     Marital status:      Spouse name: Not on file     Number of children: Not on file     Years of education: Not on file     Highest education level: Not on file   Occupational History     Not on file   Social Needs     Financial resource strain: Not on file     Food insecurity:     Worry: Not on file     Inability: Not on file     Transportation needs:     Medical: Not on file     Non-medical: Not on file   Tobacco Use     Smoking status: Never Smoker     Smokeless tobacco: Never Used   Substance and Sexual Activity     Alcohol use: Yes     Alcohol/week: 7.0 standard drinks     Types: 7 Glasses of wine per week     Drug use: No     Sexual activity: Not on file   Lifestyle     Physical activity:     Days per week: Not on file     Minutes per session: Not on file     Stress: Not on file   Relationships     Social connections:     Talks on phone: Not on file     Gets together: Not on file     Attends Pentecostalism service: Not on file     Active member of club or organization: Not on file     Attends meetings of clubs or organizations: Not on file     Relationship status: Not on file     Intimate partner violence:     Fear of current or ex partner: Not on file     Emotionally abused: Not on file     Physically abused: Not on file     Forced sexual activity: Not on  file   Other Topics Concern     Incontinent Not Asked     Toileting independently Not Asked     Cognitive impairment Not Asked     Vision impairment Not Asked     Hearing impairment Not Asked     Dentures Not Asked   Social History Narrative     Not on file       REVIEW OF SYSTEM:  Pertinent items are noted in HPI.  A 12 point comprehensive review of systems was negative except as noted.    PHYSICAL EXAM:     General Appearance:    Alert, cooperative, no distress, appears stated age; frail   Head:    Normocephalic, without obvious abnormality, atraumatic   Eyes:    PERRL, conjunctiva/corneas clear, both eyes; wears glasses   Ears:    Normal external ear canals, both ears   Nose:   Nares normal, septum midline, mucosa normal, no drainage    or sinus tenderness   Throat:   Lips, mucosa, and tongue normal; teeth and gums normal   Neck:   Supple, symmetrical, trachea midline, no adenopathy;     thyroid:  no enlargement/tenderness/nodules; no carotid    bruit or JVD   Back:     Symmetric, no curvature, ROM normal, no CVA tenderness   Lungs:     Clear to auscultation bilaterally, respirations unlabored   Chest Wall:    No tenderness or deformity    Heart:    Regular rate and rhythm, S1 and S2 normal, no murmur, rub   or gallop   Abdomen:     Soft, non-tender, bowel sounds active all four quadrants,     no masses, no organomegaly   Extremities:   Extremities normal, atraumatic, no cyanosis; BLE moderate nonpitting edema; severe erythema and tophi on bilateral great toes hot to touch; tophi opened up on left third and fourth toes and right second toe   Pulses:   2+ and symmetric all extremities   Skin:   Skin color, texture, turgor normal, no rashes or lesions   Neurologic:   Oriented to person; forgetful at times; exhibits logical thought processes; generalized weakness         LABS:     Lab Results   Component Value Date    WBC 5.7 11/15/2019    HGB 10.1 (L) 11/15/2019    HCT 33.2 (L) 11/15/2019     11/15/2019     CHOL 138 01/11/2016    TRIG 83 01/11/2016    HDL 56 01/11/2016    ALT 28 11/15/2019    AST 33 11/15/2019     11/21/2019    K 3.8 11/21/2019     (H) 11/21/2019    CREATININE 1.31 (H) 11/21/2019    BUN 23 11/21/2019    CO2 9 (LL) 11/21/2019    TSH 2.04 09/05/2017    INR 1.07 11/02/2019        ASSESSMENT:      ICD-10-CM    1. Physical deconditioning R53.81    2. Acute gout of foot, unspecified cause, unspecified laterality M10.9    3. Open wound of toe, subsequent encounter S91.109D        PLAN:      Physical deconditioning  -PT/OT as directed  -Discharge from facility per therapy recommendations  -Discharge home pending at this time    Gout  -Check CRP on 11/25/19  -START prednisone 40 mg x 2 days, 30 mg x 2 days, 20 mg x 2 days, and 10 mg x 2 days  -CHANGE Tylenol to 500 mg Q4H PRN  -DISCONTINUE diclofenac 1% gel from two times a day to three times a day  -Continue Scheduled colchicine 0.6 mg tab 2 tabs in AM and 1 tab in PM  -Encouraged patient to utilize cold therapy three times a day and prn  -Encouraged patient to utilize integrative therapies such as distraction and deep breathing for pain management  -Notify provider if patient has new or worsening pain      Multiple open toe wounds  -Continue medihoney to open toe wounds covered with telfa wrapped with kerlix and ace wraps  -Notify provider if patient has s/s of systemic infection including fever, chills, night sweats     Otherwise continue current care plan for all other chronic medical conditions, as they are stable. Encouraged patient to engage in healthy lifestyle behaviors such as engaging in social activities, exercising (PT/OT), eating well, and following care plan. Follow up for routine check-up, or sooner if needed. Will continue to monitor patient and work with nursing staff collaboratively to work toward positive patient outcomes.     Electronically signed by: Mansi Fitzpatrick, JESUS

## 2021-06-03 NOTE — PROGRESS NOTES
[facility]    Facility:   [facility] [[id number]]   Code Status: DNR/DNI and POLST AVAILABLE      CHIEF COMPLAINT/REASON FOR VISIT:  Chief Complaint   Patient presents with     Review Of Multiple Medical Conditions       HISTORY:      HPI: Janice is a 89 y.o. female who was admitted to [facility] from 11/1/19-11/7/19 for colitis.  Janice  has a past medical history of Anemia, Arthritis, CHF (congestive heart failure) (H), Coronary artery disease, Erosive gastritis, History of non-ST elevation myocardial infarction (NSTEMI), History of transfusion, Hyperlipidemia, and Hypertension.  Janice has multiple dark stools prior to hospitalization with resulting dehydration and syncopal episode at home with fall.  She was treated with IV fluids, ciprofloxacin and Flagyl for infectious colitis with CT abdomen showing colitis extending from mid transverse colon through the rectum, large amount of stool in the rectum and borderline dilated small bowel loops with air-fluid levels.  Her diarrhea has since improved with loperamide.  She also had SANTHOSH upon hospital admission improved with IV hydration.  Janice was also started on prednisone during her inpatient stay for suspected gout with erythema on her bilateral 1st metatarsals.  She is currently at [facility] s/p acute rehabilitation.      Today Ms. Gaspar is being evaluated for a review of multiple medical conditions.  Her main concern is continued bilateral foot pain with worsening erythema and edema rated at 6-7/10 taking Voltaren gel, prednisone, and Tylenol for pain management at this time.  We discussed doing labs tomorrow for further evaluation with her and her son and they were agreeable to this plan along with starting colchicine for suspected acute gout.  Her hemoglobin was low today at 8.5 decreased from 9.8 on 11/5/19.  Janice has been tolerating therapy exercises well with her bilateral foot pain.  The patient denied  lightheadedness, dizziness, breathing difficulty, chest pain, palpitations, constipation, urinary symptoms, and numbness or tingling in extremities.  Nursing staff denied any other concerns for the patient at this time.      Past Medical History:   Diagnosis Date     Anemia      Arthritis      CHF (congestive heart failure) (H)     Acute, systolic     Coronary artery disease      Erosive gastritis     History of     History of non-ST elevation myocardial infarction (NSTEMI)      History of transfusion      Hyperlipidemia      Hypertension     Essential             No family history on file.  Social History     Socioeconomic History     Marital status:      Spouse name: Not on file     Number of children: Not on file     Years of education: Not on file     Highest education level: Not on file   Occupational History     Not on file   Social Needs     Financial resource strain: Not on file     Food insecurity:     Worry: Not on file     Inability: Not on file     Transportation needs:     Medical: Not on file     Non-medical: Not on file   Tobacco Use     Smoking status: Never Smoker     Smokeless tobacco: Never Used   Substance and Sexual Activity     Alcohol use: Yes     Alcohol/week: 7.0 standard drinks     Types: 7 Glasses of wine per week     Drug use: No     Sexual activity: Not on file   Lifestyle     Physical activity:     Days per week: Not on file     Minutes per session: Not on file     Stress: Not on file   Relationships     Social connections:     Talks on phone: Not on file     Gets together: Not on file     Attends Protestant service: Not on file     Active member of club or organization: Not on file     Attends meetings of clubs or organizations: Not on file     Relationship status: Not on file     Intimate partner violence:     Fear of current or ex partner: Not on file     Emotionally abused: Not on file     Physically abused: Not on file     Forced sexual activity: Not on file   Other Topics  Concern     Not on file   Social History Narrative     Not on file       REVIEW OF SYSTEM:  Pertinent items are noted in HPI.  A 12 point comprehensive review of systems was negative except as noted.    PHYSICAL EXAM:     General Appearance:    Alert, cooperative, no distress, appears stated age; frail   Head:    Normocephalic, without obvious abnormality, atraumatic   Eyes:    PERRL, conjunctiva/corneas clear, both eyes   Ears:    Normal external ear canals, both ears   Nose:   Nares normal, septum midline, mucosa normal, no drainage    or sinus tenderness   Throat:   Lips, mucosa, and tongue normal; teeth and gums normal   Neck:   Supple, symmetrical, trachea midline, no adenopathy;     thyroid:  no enlargement/tenderness/nodules; no carotid    bruit or JVD   Back:     Symmetric, no curvature, ROM normal, no CVA tenderness   Lungs:     Clear to auscultation bilaterally, respirations unlabored   Chest Wall:    No tenderness or deformity    Heart:    Regular rate and rhythm, S1 and S2 normal, no murmur, rub   or gallop   Abdomen:     Soft, non-tender, bowel sounds active all four quadrants,     no masses, no organomegaly   Extremities:   Extremities normal, atraumatic, no cyanosis; BLE moderate nonpitting edema; severe erythema and tophi on bilateral great toes hot to touch   Pulses:   2+ and symmetric all extremities   Skin:   Skin color, texture, turgor normal, no rashes or lesions   Neurologic:   Oriented to person; forgetful at times; exhibits logical thought processes; generalized weakness         LABS:     Lab Results   Component Value Date    WBC 9.7 11/11/2019    HGB 8.5 (L) 11/11/2019    HCT 27.0 (L) 11/11/2019     11/11/2019    CHOL 138 01/11/2016    TRIG 83 01/11/2016    HDL 56 01/11/2016    ALT 16 11/02/2019    AST 29 11/02/2019     11/11/2019    K 3.8 11/11/2019     (H) 11/11/2019    CREATININE 0.91 11/11/2019    BUN 26 11/11/2019    CO2 21 (L) 11/11/2019    TSH 2.04 09/05/2017    INR  1.07 11/02/2019        ASSESSMENT:      ICD-10-CM    1. Physical deconditioning R53.81    2. Anemia, unspecified type D64.9    3. Acute gout of foot, unspecified cause, unspecified laterality M10.9    4. Pain in both feet M79.671     M79.672        PLAN:      Physical deconditioning  -PT/OT as directed  -Discharge from facility per therapy recommendations  -Discharge home pending at this time    Gout/Foot pain  -Continue prednisone 30 mg daily  -Continue Tylenol from 1 g three times a day to four times a day  -Continue diclofenac 1% gel from two times a day to three times a day  -START colchicine 0.6 mg tab 2 tabs in AM and 1 tab daily PRN  -Check uric acid level, ESR, and CRP on 11/12/19  -Encouraged patient to utilize cold therapy three times a day and prn  -Encouraged patient to utilize integrative therapies such as distraction and deep breathing for pain management  -Notify provider if patient has new or worsening pain      Anemia  Lab Results   Component Value Date    HGB 8.5 (L) 11/11/2019   -Recheck hemoglobin on 11/15/19  -Continue ferrous gluconate 324 mg daily    Otherwise continue current care plan for all other chronic medical conditions, as they are stable. Encouraged patient to engage in healthy lifestyle behaviors such as engaging in social activities, exercising (PT/OT), eating well, and following care plan. Follow up for routine check-up, or sooner if needed. Will continue to monitor patient and work with nursing staff collaboratively to work toward positive patient outcomes.     Electronically signed by: Mansi Fitzpatrick CNP     Total floor/unit time was 60 minutes and 40 minutes was spent in counseling patient on gout management, pain management, and acute rehabilitation and coordination of care with nursing staff and .

## 2021-06-03 NOTE — PROGRESS NOTES
[facility]    Facility:   [facility] [[id number]]   Code Status: DNR/DNI and POLST AVAILABLE      CHIEF COMPLAINT/REASON FOR VISIT:  Chief Complaint   Patient presents with     Review Of Multiple Medical Conditions       HISTORY:      HPI: Janice is a 89 y.o. female who was admitted to [facility] from 11/1/19-11/7/19 for colitis.  Janice  has a past medical history of Anemia, Arthritis, CHF (congestive heart failure) (H), Coronary artery disease, Erosive gastritis, History of non-ST elevation myocardial infarction (NSTEMI), History of transfusion, Hyperlipidemia, and Hypertension.  Janice has multiple dark stools prior to hospitalization with resulting dehydration and syncopal episode at home with fall.  She was treated with IV fluids, ciprofloxacin and Flagyl for infectious colitis with CT abdomen showing colitis extending from mid transverse colon through the rectum, large amount of stool in the rectum and borderline dilated small bowel loops with air-fluid levels.  Her diarrhea has since improved with loperamide.  She also had SANTHOSH upon hospital admission improved with IV hydration.  Janice was also started on prednisone during her inpatient stay for suspected gout with erythema on her bilateral 1st metatarsals.  She is currently at [facility] s/p acute rehabilitation.      Today Ms. Gaspar is being evaluated for a review of multiple medical conditions.  ROS is limited due to patient history of cognitive impairment.  Her potassium was improved today at 3.9 taking 10 meq potassium supplementation.  She said that her bilateral foot pain has been better over the weekend.  Nursing staff were concerned because the patient has four open toe wounds from draining tophi.  Her BLE edema has resolved at this time.  She finished her five-day course of prednisone 40 mg daily yesterday for gout exacerbation.  The patient denied lightheadedness, dizziness, breathing difficulty,  chest pain, palpitations, constipation, urinary symptoms, numbness or tingling in extremities, and pain.        Past Medical History:   Diagnosis Date     Anemia      Arthritis      CHF (congestive heart failure) (H)     Acute, systolic     Coronary artery disease      Erosive gastritis     History of     History of non-ST elevation myocardial infarction (NSTEMI)      History of transfusion      Hyperlipidemia      Hypertension     Essential             No family history on file.  Social History     Socioeconomic History     Marital status:      Spouse name: Not on file     Number of children: Not on file     Years of education: Not on file     Highest education level: Not on file   Occupational History     Not on file   Social Needs     Financial resource strain: Not on file     Food insecurity:     Worry: Not on file     Inability: Not on file     Transportation needs:     Medical: Not on file     Non-medical: Not on file   Tobacco Use     Smoking status: Never Smoker     Smokeless tobacco: Never Used   Substance and Sexual Activity     Alcohol use: Yes     Alcohol/week: 7.0 standard drinks     Types: 7 Glasses of wine per week     Drug use: No     Sexual activity: Not on file   Lifestyle     Physical activity:     Days per week: Not on file     Minutes per session: Not on file     Stress: Not on file   Relationships     Social connections:     Talks on phone: Not on file     Gets together: Not on file     Attends Anabaptism service: Not on file     Active member of club or organization: Not on file     Attends meetings of clubs or organizations: Not on file     Relationship status: Not on file     Intimate partner violence:     Fear of current or ex partner: Not on file     Emotionally abused: Not on file     Physically abused: Not on file     Forced sexual activity: Not on file   Other Topics Concern     Incontinent Not Asked     Toileting independently Not Asked     Cognitive impairment Not Asked      Vision impairment Not Asked     Hearing impairment Not Asked     Dentures Not Asked   Social History Narrative     Not on file       REVIEW OF SYSTEM:  Pertinent items are noted in HPI.  A 12 point comprehensive review of systems was negative except as noted.    PHYSICAL EXAM:     General Appearance:    Alert, cooperative, no distress, appears stated age; frail   Head:    Normocephalic, without obvious abnormality, atraumatic   Eyes:    PERRL, conjunctiva/corneas clear, both eyes; wears glasses   Ears:    Normal external ear canals, both ears   Nose:   Nares normal, septum midline, mucosa normal, no drainage    or sinus tenderness   Throat:   Lips, mucosa, and tongue normal; teeth and gums normal   Neck:   Supple, symmetrical, trachea midline, no adenopathy;     thyroid:  no enlargement/tenderness/nodules; no carotid    bruit or JVD   Back:     Symmetric, no curvature, ROM normal, no CVA tenderness   Lungs:     Clear to auscultation bilaterally, respirations unlabored   Chest Wall:    No tenderness or deformity    Heart:    Regular rate and rhythm, S1 and S2 normal, no murmur, rub   or gallop   Abdomen:     Soft, non-tender, bowel sounds active all four quadrants,     no masses, no organomegaly   Extremities:   Extremities normal, atraumatic, no cyanosis; BLE moderate nonpitting edema; severe erythema and tophi on bilateral great toes hot to touch; tophi opened up on left third and fourth toes and right second toe   Pulses:   2+ and symmetric all extremities   Skin:   Skin color, texture, turgor normal, no rashes or lesions   Neurologic:   Oriented to person; forgetful at times; exhibits logical thought processes; generalized weakness         LABS:     Check potassium level on 11/18/19.    ASSESSMENT:      ICD-10-CM    1. Physical deconditioning R53.81    2. Acute gout of foot, unspecified cause, unspecified laterality M10.9    3. Open wound of toe, initial encounter S91.109A    4. Hypokalemia E87.6        PLAN:       Physical deconditioning  -PT/OT as directed  -Discharge from facility per therapy recommendations  -Discharge home pending at this time    Gout  Lab Results   Component Value Date    CRP 1.6 (H) 11/15/2019   -Check uric acid level and CRP on 11/21/19  -COMPLETED prednisone 40 mg daily x 5 days  -Continue Tylenol from 1 g three times a day to four times a day  -Continue diclofenac 1% gel from two times a day to three times a day  -Continue Scheduled colchicine 0.6 mg tab 2 tabs in AM and 1 tab in PM  -Encouraged patient to utilize cold therapy three times a day and prn  -Encouraged patient to utilize integrative therapies such as distraction and deep breathing for pain management  -Notify provider if patient has new or worsening pain      Multiple open toe wounds  -Apply medihoney to open toe wounds covered with telfa wrapped with kerlix and ace wraps  -Notify provider if patient has s/s of systemic infection including fever, chills, night sweats     Hypokalemia  Lab Results   Component Value Date    K 3.9 11/18/2019   -Continue potassium chloride 10 meq daily  -Check BMP on 11/21/19    Otherwise continue current care plan for all other chronic medical conditions, as they are stable. Encouraged patient to engage in healthy lifestyle behaviors such as engaging in social activities, exercising (PT/OT), eating well, and following care plan. Follow up for routine check-up, or sooner if needed. Will continue to monitor patient and work with nursing staff collaboratively to work toward positive patient outcomes.     Electronically signed by: Mansi Fitzpatrick, JESUS

## 2021-06-04 NOTE — PROGRESS NOTES
[facility]    DATE: 2019    NAME:  Janice Gaspar             :  1928  MRN: [id number]  CODE STATUS:  DNR/DNI    VISIT TYPE: Problem Visit (dehydration, diarrhea)     FACILITY:  [facility] [[id number]]       CHIEF COMPLAIN/REASON FOR VISIT:    Chief Complaint   Patient presents with     Problem Visit     dehydration, diarrhea               HISTORY OF PRESENT ILLNESS: Janice Gaspar is a 89 y.o. female who was admitted to [facility] - for fall, syncope, SANTHOSH. She was treated for colitis with cipro and flagyl. Diarrhea was controlled with imodium. SANTHOSH resolved with iv hydration. She was also treated for gout flare during tcu course. She was transferred to TCU for further rehab. She has PMH of NSTEMI, CAD, CHF, recurrent syncope, HTN, HLd, h/o GI bleed, large hiatal hernia.     Today Ms. Gaspar is seen in bed today. She says she is not sure when she last had diarrhea but doesn't think she had any overnight. She is not sure how her appetite is. She feels ok today just tired. She does feel better than yesterday. She does still have her iv in but is not currently connected to iv fluids. She denies any pain. She is not sure about wounds on her feet or toes. She is not feeling dizzy or lightheaded but has not really been up today. She denies feeling feverish. She is quite confused and forgetful. Per nursing staff no loose stools overnight or yet today. She completed iv fluids but does still have her iv in place. Staff are wondering if this should stay in or not. She had repeat labs yesterday and the day before. She is not complaining of much to staff. Staff are wondering about her leg wraps and toe wounds. They are improving and she has no edema but they are supposed to be ace wrapping her legs. They are wondering why. No fevers or other concerns. Her appetite has been fair today but has been drinking fluids. Staff are offering them to her  frequently. Her blood pressures have been on the lower side and she does have several blood pressure meds without parameters. Nursing are wondering which she should be receiving.     Later her daughter arrived and wanted to speak with provider. Discussed her lab results, new orders, plan for stopping iv fluids and re-evaluating again next week. Discussed her concerns for dehydration and failure to thrive as well as wound cares. She appreciated time and all questions were answered as provider able. She was agreeable to plan of care.     REVIEW OF SYSTEMS:  PROBLEMS AND REVIEW OF SYSTEMS:   Today on ROS:   Currently, no fever, chills, or rigors. Decreased vision and hearing. Denies any shortness of breath, or cough.. Denies any difficulty with swallowing, nausea, or vomiting. No active bleeding. No rash. Positive for diarrhea improving, appetite fair, weakness, confusion, forgetfulness, low blood pressures, urinary incontinence; unable to obtain further Ros due to cognition      No Known Allergies  Current Outpatient Medications   Medication Sig     metoprolol succinate (TOPROL-XL) 50 MG 24 hr tablet Take 50 mg by mouth daily.     acetaminophen (TYLENOL) 500 MG tablet Take 1,000 mg by mouth every 6 (six) hours.            aspirin 81 MG EC tablet TAKE 1 TABLET BY MOUTH ONCE DAILY (Patient taking differently: Take 81 mg by mouth daily.       )     atorvastatin (LIPITOR) 40 MG tablet TAKE 1 TABLET BY MOUTH ONCE DAILY (Patient taking differently: Take 40 mg by mouth at bedtime.       )     buPROPion (WELLBUTRIN SR) 100 MG 12 hr tablet Take 100 mg by mouth daily.     cholecalciferol, vitamin D3, 1,000 unit (25 mcg) tablet Take 1,000 Units by mouth daily.     colchicine 0.6 mg tablet Take 0.6 mg by mouth 2 (two) times a day. Take 2 tabs in AM and 1 tab in PM..     diclofenac sodium (VOLTAREN) 1 % Gel Apply 8 g topically 2 (two) times a day. Apply to 4gm to  both hips and 4 gms to lower back           ferrous gluconate 324  mg (37.5 mg iron) Tab TAKE 1 TABLET BY MOUTH ONCE DAILY     folic acid (FOLVITE) 1 MG tablet Take 1 mg by mouth daily.     Lactobacillus rhamnosus GG (CULTURELLE) 15 billion cell CpSP Take 1 capsule by mouth 2 (two) times a day with meals.     lisinopril (PRINIVIL,ZESTRIL) 5 MG tablet Take 5 mg by mouth daily.      mirtazapine (REMERON) 7.5 MG tablet Take 7.5 mg by mouth at bedtime.     potassium chloride (K-DUR,KLOR-CON) 10 MEQ tablet Take 10 mEq by mouth daily.     ranitidine (ZANTAC) 300 MG tablet TAKE 1 TABLET BY MOUTH ONCE DAILY (Patient taking differently: Take 300 mg by mouth at bedtime.       )     Past Medical History:    Past Medical History:   Diagnosis Date     Anemia      Arthritis      CHF (congestive heart failure) (H)     Acute, systolic     Coronary artery disease      Erosive gastritis     History of     History of non-ST elevation myocardial infarction (NSTEMI)      History of transfusion      Hyperlipidemia      Hypertension     Essential           PHYSICAL EXAMINATION  Vitals:    12/04/19 2236   BP: 103/72   Pulse: 98   Resp: 20   Temp: 97.1  F (36.2  C)   SpO2: 94%   Weight: 105 lb (47.6 kg)       Today on physical exam:     GENERAL: Awake, Alert, not in any form of acute distress, answers questions appropriately, follows simple commands, conversant, cachectic, weakness  HEENT: Head is normocephalic with normal hair distribution. No evidence of trauma. Ears: No acute purulent discharge. Eyes: Conjunctivae pink with no scleral jaundice. Nose: Normal mucosa and septum. NECK: Supple with no cervical or supraclavicular lymphadenopathy. Trachea is midline. [place], decreased vision  CHEST: No tenderness or deformity, no crepitus  LUNG: dim to auscultation with good chest expansion. There are no crackles or wheezes, normal AP diameter.  BACK: No kyphosis of the thoracic spine. Symmetric, no curvature, ROM normal, no CVA tenderness, no spinal tenderness   CVS: There is good S1  S2, regular rhythm, there  are no murmurs, rubs, gallops, or heaves,  2+ pulses symmetric in all extremities.  ABDOMEN: concave and soft, nontender to palpation, non distended, no masses, no organomegaly, good bowel sounds, no rebound or guarding, no peritoneal signs.   EXTREMITIES: No pedal edema, bilateral foot wraps removed, toe wounds noted on 2nd and 3rd toes, small open areas, some starting to scab, minimal serous drainage noted on dressing, peripheral iv right forearm c/d/i  SKIN: Warm and dry, john discoloration ble  NEUROLOGICAL: The patient is oriented to person. Confused, forgetful, weakness.             LABS:   Recent Results (from the past 168 hour(s))   HM2(CBC w/o Differential)   Result Value Ref Range    WBC 7.8 4.0 - 11.0 thou/uL    RBC 4.47 3.80 - 5.40 mill/uL    Hemoglobin 14.8 12.0 - 16.0 g/dL    Hematocrit 46.7 35.0 - 47.0 %     (H) 80 - 100 fL    MCH 33.1 27.0 - 34.0 pg    MCHC 31.7 (L) 32.0 - 36.0 g/dL    RDW 13.9 11.0 - 14.5 %    Platelets 173 140 - 440 thou/uL    MPV 10.8 8.5 - 12.5 fL   Comprehensive Metabolic Panel   Result Value Ref Range    Sodium 136 136 - 145 mmol/L    Potassium 5.0 3.5 - 5.0 mmol/L    Chloride 114 (H) 98 - 107 mmol/L    CO2 9 (LL) 22 - 31 mmol/L    Anion Gap, Calculation 13 5 - 18 mmol/L    Glucose 197 (H) 70 - 125 mg/dL    BUN 78 (H) 8 - 28 mg/dL    Creatinine 3.27 (H) 0.60 - 1.10 mg/dL    GFR MDRD Af Amer 16 (L) >60 mL/min/1.73m2    GFR MDRD Non Af Amer 13 (L) >60 mL/min/1.73m2    Bilirubin, Total 0.4 0.0 - 1.0 mg/dL    Calcium 8.3 (L) 8.5 - 10.5 mg/dL    Protein, Total 5.3 (L) 6.0 - 8.0 g/dL    Albumin 2.9 (L) 3.5 - 5.0 g/dL    Alkaline Phosphatase 115 45 - 120 U/L    AST 38 0 - 40 U/L    ALT 31 0 - 45 U/L   Procalcitonin   Result Value Ref Range    Procalcitonin 0.24 0.00 - 0.49 ng/mL   Urinalysis-UC if Indicated   Result Value Ref Range    Color, UA Yellow Colorless, Yellow, Straw, Light Yellow    Clarity, UA Clear Clear    Glucose, UA Negative Negative    Bilirubin, UA Negative  Negative    Ketones, UA Negative Negative    Specific Gravity, UA 1.012 1.001 - 1.030    Blood, UA Negative Negative    pH, UA 5.0 4.5 - 8.0    Protein, UA Trace (!) Negative mg/dL    Urobilinogen, UA <2.0 E.U./dL <2.0 E.U./dL, 2.0 E.U./dL    Nitrite, UA Negative Negative    Leukocytes, UA Negative Negative    Bacteria, UA Few (!) None Seen hpf    RBC, UA 0-2 None Seen, 0-2 hpf    WBC, UA 0-5 None Seen, 0-5 hpf    Squam Epithel, UA 0-5 None Seen, 0-5 lpf    WBC Clumps None Seen None Seen    Mucus, UA Few (!) None Seen lpf    Hyaline Casts, UA 0-5 0-5, None Seen lpf   C. Diff Toxin By PCR   Result Value Ref Range    C.Difficile Toxigenic by PCR Positive (!) Negative    Ribotype 027/NAP1/B1 Presumptive Negative Presumptive Negative   Basic Metabolic Panel   Result Value Ref Range    Sodium 141 136 - 145 mmol/L    Potassium 3.8 3.5 - 5.0 mmol/L    Chloride 117 (H) 98 - 107 mmol/L    CO2 8 (LL) 22 - 31 mmol/L    Anion Gap, Calculation 16 5 - 18 mmol/L    Glucose 84 70 - 125 mg/dL    Calcium 8.6 8.5 - 10.5 mg/dL    BUN 71 (H) 8 - 28 mg/dL    Creatinine 2.80 (H) 0.60 - 1.10 mg/dL    GFR MDRD Af Amer 19 (L) >60 mL/min/1.73m2    GFR MDRD Non Af Amer 16 (L) >60 mL/min/1.73m2   C-Reactive Protein (CRP)   Result Value Ref Range    CRP 5.0 (H) 0.0 - 0.8 mg/dL   HM2(CBC w/o Differential)   Result Value Ref Range    WBC 5.8 4.0 - 11.0 thou/uL    RBC 4.00 3.80 - 5.40 mill/uL    Hemoglobin 13.1 12.0 - 16.0 g/dL    Hematocrit 41.9 35.0 - 47.0 %     (H) 80 - 100 fL    MCH 32.8 27.0 - 34.0 pg    MCHC 31.3 (L) 32.0 - 36.0 g/dL    RDW 14.0 11.0 - 14.5 %    Platelets 185 140 - 440 thou/uL    MPV 10.9 8.5 - 12.5 fL   Basic Metabolic Panel   Result Value Ref Range    Sodium 141 136 - 145 mmol/L    Potassium 3.6 3.5 - 5.0 mmol/L    Chloride 120 (H) 98 - 107 mmol/L    CO2 10 (L) 22 - 31 mmol/L    Anion Gap, Calculation 11 5 - 18 mmol/L    Glucose 68 (L) 70 - 125 mg/dL    Calcium 8.1 (L) 8.5 - 10.5 mg/dL    BUN 64 (H) 8 - 28 mg/dL     Creatinine 2.35 (H) 0.60 - 1.10 mg/dL    GFR MDRD Af Amer 23 (L) >60 mL/min/1.73m2    GFR MDRD Non Af Amer 19 (L) >60 mL/min/1.73m2   C-Reactive Protein (CRP)   Result Value Ref Range    CRP 6.5 (H) 0.0 - 0.8 mg/dL   HM2(CBC w/o Differential)   Result Value Ref Range    WBC 3.8 (L) 4.0 - 11.0 thou/uL    RBC 3.55 (L) 3.80 - 5.40 mill/uL    Hemoglobin 11.6 (L) 12.0 - 16.0 g/dL    Hematocrit 36.6 35.0 - 47.0 %     (H) 80 - 100 fL    MCH 32.7 27.0 - 34.0 pg    MCHC 31.7 (L) 32.0 - 36.0 g/dL    RDW 14.1 11.0 - 14.5 %    Platelets 163 140 - 440 thou/uL    MPV 10.5 8.5 - 12.5 fL     Results for orders placed or performed in visit on 12/04/19   Basic Metabolic Panel   Result Value Ref Range    Sodium 141 136 - 145 mmol/L    Potassium 3.6 3.5 - 5.0 mmol/L    Chloride 120 (H) 98 - 107 mmol/L    CO2 10 (L) 22 - 31 mmol/L    Anion Gap, Calculation 11 5 - 18 mmol/L    Glucose 68 (L) 70 - 125 mg/dL    Calcium 8.1 (L) 8.5 - 10.5 mg/dL    BUN 64 (H) 8 - 28 mg/dL    Creatinine 2.35 (H) 0.60 - 1.10 mg/dL    GFR MDRD Af Amer 23 (L) >60 mL/min/1.73m2    GFR MDRD Non Af Amer 19 (L) >60 mL/min/1.73m2         Lab Results   Component Value Date    WBC 3.8 (L) 12/04/2019    HGB 11.6 (L) 12/04/2019    HCT 36.6 12/04/2019     (H) 12/04/2019     12/04/2019       Lab Results   Component Value Date    [id number] 292 05/20/2019     No results found for: HGBA1C  Lab Results   Component Value Date    INR 1.07 11/02/2019    INR 1.01 01/10/2016    INR 0.96 04/24/2014     Vitamin D, Total (25-Hydroxy)   Date Value Ref Range Status   09/24/2018 37.6 30.0 - 80.0 ng/mL Final     Lab Results   Component Value Date    TSH 2.04 09/05/2017           ASSESSMENT/PLAN:    1. Infectious colitis, C diff colitis: was put on cipro and flagyl, but now also started on vanco for positive c diff. Will dc cipro and flagyl at this time. On ranitidine and pantoprazole, recently started on pantoprazole. PPI treatment contraindicated in c diff as can  contribute to infection. No complaints of nausea, indigestion. Will dc PPI. Continue ranitidine for now. Improved loose stools today. Imodium was stopped. On probiotic two times a day.   2. SANTHOSH: received 3L of IV fluids in past few days. Peripheral iv still in right forearm. Cr improved yesterdays labs from 1.82-3.27-2.8-2.35. not far from baseline at this time. Will have staff offer liquids [id number]. Encourage intake. Hold lasix, which will also help and recheck on 12/9. Dc peripheral iv.   3. CAD: No chest pain or concerns. Continue current regimen.   4. CHF: Daily [id number]. No shortness of breath or edema noted today. Currently on lasix po but also being treated for SANTHOSH, hypovolemia, hypotension. Will hold lasix at this time. May restart on 12/9, labs ordered for 12/9 to determine if will restart or not.   5. HTN: Now hypotensive 90-100s. Asymptomatic but has not been up much. Mostly likely s/t hypovolemia and acute illness. On multiple hypertensive, no hold parameters. Will dc amlodipine, reduced lisinopril dose, decrease metoprolol, added hold parameters to all meds.   6. Depression: controlled on wellbutrin. On remeron as well.   7. Hypokalemia: on supplement.   8. Chronic pain: on voltaren gel, tylenol. No complaints today.   9. Bilateral toe wounds: examined today, very small wounds on 2nd and 3rd toe bilateral feet. Minimal drainage. Appears nearly scabbed, will change wound cares to medihoney, non adherent gauze, wrap with kerlix on toes only. Dc ace wraps to feet and legs. No edema at this time.   10. Mild cognitive impairment: a/o x 1 today. Confused but calm. No behavior concerns.     Electronically signed by: Nishi Yusuf NP    Total floor/unit time spent 35 min with 25 min spent on counseling and coordination of care. Counseling regarding lab results, diagnoses, prognosis, treatment recommendations, SANTHOSH and hypovolemia management, further lab monitoring, treatment for c diff, treatment for  chf, wound cares, specialty care follow ups. Coordinated care with nursing for management of lab monitoring, chf management, maria luz and hypotension management, med changes.

## 2021-06-04 NOTE — TELEPHONE ENCOUNTER
[facility] Nurse Triage Telephone Note      Provider: ERIKA Mathis  Facility: [facility]     Facility Type: TCU    Caller: Hazel()  Call Back Number:  [phone]    Allergies: Patient has no known allergies.    Reason for call: SW is calling on behalf of patient's family's request for a hospice consult.  Patient is supposed to discharge home this week.       Verbal Order/Direction given by Provider: Ok for hospice to eval and treat.      Provider giving order: DANIEL Gonzales    Verbal order given to: Sugar Pedro RN

## 2021-06-04 NOTE — PROGRESS NOTES
[facility]    Facility:   [facility] [[id number]]   Code Status: DNR/DNI and POLST AVAILABLE      CHIEF COMPLAINT/REASON FOR VISIT:  Chief Complaint   Patient presents with     Problem Visit       HISTORY:      HPI: Janice is a 89 y.o. female who was admitted to [facility] from 11/1/19-11/7/19 for colitis.  Janice  has a past medical history of Anemia, Arthritis, CHF (congestive heart failure) (H), Coronary artery disease, Erosive gastritis, History of non-ST elevation myocardial infarction (NSTEMI), History of transfusion, Hyperlipidemia, and Hypertension.  Janice has multiple dark stools prior to hospitalization with resulting dehydration and syncopal episode at home with fall.  She was treated with IV fluids, ciprofloxacin and Flagyl for infectious colitis with CT abdomen showing colitis extending from mid transverse colon through the rectum, large amount of stool in the rectum and borderline dilated small bowel loops with air-fluid levels.  Her diarrhea has since improved with loperamide.  She also had SANTHOSH upon hospital admission improved with IV hydration.  Janice was also started on prednisone during her inpatient stay for suspected gout with erythema on her bilateral 1st metatarsals.  She is currently at [facility] s/p acute rehabilitation.      Today Ms. Gaspar is being evaluated for a review of multiple medical conditions.  ROS is limited due to patient history of cognitive impairment.  Janice's daughters are concerned that their mother has had a decreased appetite since her admission to [facility].  RD has been working with patient to increase caloric intake at meals and provided nutritional supplement.  She has had a 7 lbs weight loss over the past two weeks with decreased oral intake.  She denied any abdominal pain, gastric upset, or reflux at this time.  Nursing staff have been encouraging her to increase her fluid intake with acute kidney injury noted on her BMP  today with creatinine 1.82 increased from 1.3 earlier this week.  She has been intermittently participating in therapy and has not been able to ambulate or perform ADLs without moderate to total assistance at this time.  The patient denied lightheadedness, dizziness, breathing difficulty, chest pain, palpitations, constipation, urinary symptoms, numbness or tingling in extremities, and pain.        Past Medical History:   Diagnosis Date     Anemia      Arthritis      CHF (congestive heart failure) (H)     Acute, systolic     Coronary artery disease      Erosive gastritis     History of     History of non-ST elevation myocardial infarction (NSTEMI)      History of transfusion      Hyperlipidemia      Hypertension     Essential             No family history on file.  Social History     Socioeconomic History     Marital status:      Spouse name: Not on file     Number of children: Not on file     Years of education: Not on file     Highest education level: Not on file   Occupational History     Not on file   Social Needs     Financial resource strain: Not on file     Food insecurity:     Worry: Not on file     Inability: Not on file     Transportation needs:     Medical: Not on file     Non-medical: Not on file   Tobacco Use     Smoking status: Never Smoker     Smokeless tobacco: Never Used   Substance and Sexual Activity     Alcohol use: Yes     Alcohol/week: 7.0 standard drinks     Types: 7 Glasses of wine per week     Drug use: No     Sexual activity: Not on file   Lifestyle     Physical activity:     Days per week: Not on file     Minutes per session: Not on file     Stress: Not on file   Relationships     Social connections:     Talks on phone: Not on file     Gets together: Not on file     Attends Yarsanism service: Not on file     Active member of club or organization: Not on file     Attends meetings of clubs or organizations: Not on file     Relationship status: Not on file     Intimate partner  violence:     Fear of current or ex partner: Not on file     Emotionally abused: Not on file     Physically abused: Not on file     Forced sexual activity: Not on file   Other Topics Concern     Incontinent Not Asked     Toileting independently Not Asked     Cognitive impairment Not Asked     Vision impairment Not Asked     Hearing impairment Not Asked     Dentures Not Asked   Social History Narrative     Not on file       REVIEW OF SYSTEM:  Pertinent items are noted in HPI.  A 12 point comprehensive review of systems was negative except as noted.    PHYSICAL EXAM:     General Appearance:    Alert, cooperative, no distress, appears stated age; frail   Head:    Normocephalic, without obvious abnormality, atraumatic   Eyes:    PERRL, conjunctiva/corneas clear, both eyes; wears glasses   Ears:    Normal external ear canals, both ears   Nose:   Nares normal, septum midline, mucosa normal, no drainage    or sinus tenderness   Throat:   Lips, mucosa, and tongue normal; teeth and gums normal   Neck:   Supple, symmetrical, trachea midline, no adenopathy;     thyroid:  no enlargement/tenderness/nodules; no carotid    bruit or JVD   Back:     Symmetric, no curvature, ROM normal, no CVA tenderness   Lungs:     Clear to auscultation bilaterally, respirations unlabored   Chest Wall:    No tenderness or deformity    Heart:    Regular rate and rhythm, S1 and S2 normal, no murmur, rub   or gallop   Abdomen:     Soft, non-tender, bowel sounds active all four quadrants,     no masses, no organomegaly   Extremities:   Extremities normal, atraumatic, no cyanosis; BLE moderate nonpitting edema; severe erythema and tophi on bilateral great toes; tophi opened up on left third and fourth toes and right second toe   Pulses:   2+ and symmetric all extremities   Skin:   Skin color, texture, turgor normal, no rashes or lesions   Neurologic:   Oriented to person; forgetful at times; exhibits logical thought processes; generalized weakness          LABS:     None ordered at this visit.    ASSESSMENT:      ICD-10-CM    1. SANTHOSH (acute kidney injury) (H) N17.9    2. Physical deconditioning R53.81    3. Decreased appetite R63.0        PLAN:      Physical deconditioning  -PT/OT as directed  -Discharge from facility per therapy recommendations  -Discharge home anticipated for 12/6/19 with home health services    SANTHOSH  -Creatinine 1.82 today  -Recheck next week  -Encourage oral fluid intake    Decreased appetite  -Continue to work with RD for increased caloric intake  -Continue to encourage patient to eat at meals    Otherwise continue current care plan for all other chronic medical conditions, as they are stable. Encouraged patient to engage in healthy lifestyle behaviors such as engaging in social activities, exercising (PT/OT), eating well, and following care plan. Follow up for routine check-up, or sooner if needed. Will continue to monitor patient and work with nursing staff collaboratively to work toward positive patient outcomes.     Electronically signed by: Mansi Fitzpatrick, JESUS

## 2021-06-04 NOTE — PROGRESS NOTES
[facility]    Facility:   [facility] [[id number]]   Code Status: DNR/DNI and POLST AVAILABLE      CHIEF COMPLAINT/REASON FOR VISIT:  Chief Complaint   Patient presents with     Problem Visit     diarrhea       HISTORY:      HPI: Janice is a 89 y.o. female who was admitted to [facility] from 11/1/19-11/7/19 for colitis.  Janice  has a past medical history of Anemia, Arthritis, CHF (congestive heart failure) (H), Coronary artery disease, Erosive gastritis, History of non-ST elevation myocardial infarction (NSTEMI), History of transfusion, Hyperlipidemia, and Hypertension.  Janice has multiple dark stools prior to hospitalization with resulting dehydration and syncopal episode at home with fall.  She was treated with IV fluids, ciprofloxacin and Flagyl for infectious colitis with CT abdomen showing colitis extending from mid transverse colon through the rectum, large amount of stool in the rectum and borderline dilated small bowel loops with air-fluid levels.  Her diarrhea has since improved with loperamide.  She also had SANTHOSH upon hospital admission improved with IV hydration.  Janice was also started on prednisone during her inpatient stay for suspected gout with erythema on her bilateral 1st metatarsals.  She is currently at [facility] s/p acute rehabilitation.      Today Ms. Gaspar is being evaluated for a review of multiple medical conditions.  ROS is limited due to patient history of cognitive impairment.  Nursing staff noted that the patient has not eaten and drank very little all weekend along with 4-5 daily episodes of watery diarrhea.  No providers were notified over the weekend with acute patient changes.  Janice was also noted to have increased weakness and lethargy refusing to leave her bed for the past three days.  She had infectious colitis during her hospitalization that seemed to have resolved with ciprofloxacin and Flagyl treatment along with Culturelle  probiotic.  Her creatinine was significantly elevated today on her STAT lab at 3.27 with IV NS ordered.  Her CBC and procalcitonin were both normal on STAT labs today.  Patient's daughter, Angie, was updated with her status and was agreeable to IV fluid resuscitation at this time along with Flagyl treatment for suspected infectious colitis.  The patient denied lightheadedness, dizziness, breathing difficulty, chest pain, palpitations, constipation, urinary symptoms, numbness or tingling in extremities, and pain.        Past Medical History:   Diagnosis Date     Anemia      Arthritis      CHF (congestive heart failure) (H)     Acute, systolic     Coronary artery disease      Erosive gastritis     History of     History of non-ST elevation myocardial infarction (NSTEMI)      History of transfusion      Hyperlipidemia      Hypertension     Essential             No family history on file.  Social History     Socioeconomic History     Marital status:      Spouse name: Not on file     Number of children: Not on file     Years of education: Not on file     Highest education level: Not on file   Occupational History     Not on file   Social Needs     Financial resource strain: Not on file     Food insecurity:     Worry: Not on file     Inability: Not on file     Transportation needs:     Medical: Not on file     Non-medical: Not on file   Tobacco Use     Smoking status: Never Smoker     Smokeless tobacco: Never Used   Substance and Sexual Activity     Alcohol use: Yes     Alcohol/week: 7.0 standard drinks     Types: 7 Glasses of wine per week     Drug use: No     Sexual activity: Not on file   Lifestyle     Physical activity:     Days per week: Not on file     Minutes per session: Not on file     Stress: Not on file   Relationships     Social connections:     Talks on phone: Not on file     Gets together: Not on file     Attends [employer] service: Not on file     Active member of club or organization: Not on file      Attends meetings of clubs or organizations: Not on file     Relationship status: Not on file     Intimate partner violence:     Fear of current or ex partner: Not on file     Emotionally abused: Not on file     Physically abused: Not on file     Forced sexual activity: Not on file   Other Topics Concern     Incontinent Not Asked     Toileting independently Not Asked     Cognitive impairment Not Asked     Vision impairment Not Asked     Hearing impairment Not Asked     Dentures Not Asked   Social History Narrative     Not on file       REVIEW OF SYSTEM:  Pertinent items are noted in HPI.  A 12 point comprehensive review of systems was negative except as noted.    PHYSICAL EXAM:     General Appearance:    lethargic, cooperative, no distress, appears stated age; frail   Head:    Normocephalic, without obvious abnormality, atraumatic   Eyes:    PERRL, conjunctiva/corneas clear, both eyes; wears glasses   Ears:    Normal external ear canals, both ears   Nose:   Nares normal, septum midline, mucosa normal, no drainage    or sinus tenderness   Throat:   Lips, mucosa, and tongue normal; teeth and gums normal   Neck:   Supple, symmetrical, trachea midline, no adenopathy;     thyroid:  no enlargement/tenderness/nodules; no carotid    bruit or JVD   Back:     Symmetric, no curvature, ROM normal, no CVA tenderness   Lungs:     Clear to auscultation bilaterally, respirations unlabored   Chest Wall:    No tenderness or deformity    Heart:    Regular rate and rhythm, S1 and S2 normal, no murmur, rub   or gallop   Abdomen:     Soft, distended; diffuse tenderness to palpation, bowel sounds hyperactive all four quadrants, no masses, no organomegaly   Extremities:   Extremities normal, atraumatic, no cyanosis; BLE moderate nonpitting edema; severe erythema and tophi on bilateral great toes; tophi opened up on left third and fourth toes and right second toe   Pulses:   2+ and symmetric all extremities   Skin:   Skin color, texture,  turgor normal, no rashes or lesions   Neurologic:   Oriented to person; forgetful at times; exhibits logical thought processes; generalized weakness; lying in bed during this visit         LABS:     Check BMP, CBC, and CRP on 12/3/19.    ASSESSMENT:      ICD-10-CM    1. Physical deconditioning R53.81    2. SANTHOSH (acute kidney injury) (H) N17.9    3. Diarrhea of presumed infectious origin R19.7        PLAN:      Physical deconditioning  -PT/OT as directed  -Discharge from facility per therapy recommendations  -Discharge home anticipated for 12/6/19 with home health services unsure at this time    Diarrhea  -STAT CMP, procalcitonin, CBC, and C. Diff stool culture  -Check CBC and CRP tomorrow 12/3/19  -START Flagyl 500 mg Q8H x 10 days for presumed infectious colitis  -START Florastor 250 mg two times a day  -Continue Culturelle as prescribed  -Notify provider if patient has s/s of systemic infection including fever, chills, night sweats     SANTHOSH  -Check STAT UA/UC  -Check BMP tomorrow 12/3/19  -Creatinine 3.27 today  -STAT 1 L NS over 8 hours per PIV    Otherwise continue current care plan for all other chronic medical conditions, as they are stable. Encouraged patient to engage in healthy lifestyle behaviors such as engaging in social activities, exercising (PT/OT), eating well, and following care plan. Follow up for routine check-up, or sooner if needed. Will continue to monitor patient and work with nursing staff collaboratively to work toward positive patient outcomes.     Electronically signed by: Mansi Fitzpatrick CNP     Total floor/unit time was 60 minutes and 45 minutes was spent in counseling patient and her daughter on bowel management, SANTHOSH management, and discharge potential in coordination of care with nursing staff and SW.

## 2021-06-04 NOTE — TELEPHONE ENCOUNTER
[facility] Nurse Triage Telephone Note      Provider: DANIEL Gonzales  Facility: [facility]     Facility Type: TCU    Caller: Hazel ()  Call Back Number:  [phone]    Allergies: Patient has no known allergies.    Reason for call: Pt will be discharging and family and  is requesting an order for a hospital bed, the pt did have a hospice consult but decided not to sign on and would like to try to continue with therapy upon d/c.     Verbal Order/Direction given by Provider: The pt doesn't have a qualifying dx to have a hospital bed covered by insurance so no need for an order as it will not be covered by insurance. If family would like to pay for a rental out of pocket they can but there is no need for an order by the NP    Provider giving order: DANIEL Gonzales    Verbal order given to: Hazel (left VM)      Danelle Lr RN

## 2021-06-04 NOTE — TELEPHONE ENCOUNTER
[facility] Nurse Triage Telephone Note      Provider: ERIKA Mathis  Facility: [facility]     Facility Type: TCU    Caller: Hazel  Call Back Number:  [phone]    Allergies: Patient has no known allergies.    Reason for call: K+ 3.3 on K 10mEq daily, Lasix on hold since 12/5, received IV fluids last week.  GFR today 47 (19), BUN 24 (64), Creat 1.09 (2.35). Wt 111.4 (NA wt 115), 12/4 wt 105#. No shortness of breath or edema.     Verbal Order/Direction given by Provider: DC Lasix    Provider giving order: DANIEL Gonzales    Verbal order given to: Sugar Hilton RN

## 2021-06-04 NOTE — TELEPHONE ENCOUNTER
[facility] Nurse Triage Telephone Note      Provider: ERIKA Mathis  Facility: [facility]     Facility Type: TCU    Caller: Coral   Call Back Number:  [phone]    Allergies: Patient has no known allergies.    Reason for call: CBC-  WBC 3.58 Hgb 11.6, BMP-improving, CRP elevated.   Received 1 liter of fluid yesterday. At some of each meal today.     Verbal Order/Direction given by Provider: NNO, no further labs    Provider giving order: DANIEL Gonzales    Verbal order given to: Huy Lr RN

## 2021-06-04 NOTE — PROGRESS NOTES
[facility]    Facility:   [facility] [[id number]]   Code Status: DNR/DNI and POLST AVAILABLE      CHIEF COMPLAINT/REASON FOR VISIT:  Chief Complaint   Patient presents with     Problem Visit     c. difficile colitis       HISTORY:      HPI: Janice is a 89 y.o. female who was admitted to [facility] from 11/1/19-11/7/19 for colitis.  Janice  has a past medical history of Anemia, Arthritis, CHF (congestive heart failure) (H), Coronary artery disease, Erosive gastritis, History of non-ST elevation myocardial infarction (NSTEMI), History of transfusion, Hyperlipidemia, and Hypertension.  Janice has multiple dark stools prior to hospitalization with resulting dehydration and syncopal episode at home with fall.  She was treated with IV fluids, ciprofloxacin and Flagyl for infectious colitis with CT abdomen showing colitis extending from mid transverse colon through the rectum, large amount of stool in the rectum and borderline dilated small bowel loops with air-fluid levels.  Her diarrhea has since improved with loperamide.  She also had SANTHOSH upon hospital admission improved with IV hydration.  Janice was also started on prednisone during her inpatient stay for suspected gout with erythema on her bilateral 1st metatarsals.  She is currently at [facility] s/p acute rehabilitation.      Today Ms. Gaspar is being evaluated for a review of multiple medical conditions.  ROS is limited due to patient history of cognitive impairment.  Janice had a positive C. Difficile stool culture that resulted today.  She has been taking Flagyl since yesterday for infectious colitis.  Her diarrhea has slowed down with 2 stools over the past 24 hours.  Her creatinine was improved to 2.8 today after receiving 1 L NS yesterday evening.  She continues to have poor oral intake today.  Updated her daughter, Angie, who is her POA with lab results and she would like the patient to continue to be treated at  the TCU facility at this time and is okay with further IV fluids for dehydration at this time.  The patient denied lightheadedness, dizziness, breathing difficulty, chest pain, palpitations, constipation, urinary symptoms, numbness or tingling in extremities, and pain.       Past Medical History:   Diagnosis Date     Anemia      Arthritis      CHF (congestive heart failure) (H)     Acute, systolic     Coronary artery disease      Erosive gastritis     History of     History of non-ST elevation myocardial infarction (NSTEMI)      History of transfusion      Hyperlipidemia      Hypertension     Essential             No family history on file.  Social History     Socioeconomic History     Marital status:      Spouse name: Not on file     Number of children: Not on file     Years of education: Not on file     Highest education level: Not on file   Occupational History     Not on file   Social Needs     Financial resource strain: Not on file     Food insecurity:     Worry: Not on file     Inability: Not on file     Transportation needs:     Medical: Not on file     Non-medical: Not on file   Tobacco Use     Smoking status: Never Smoker     Smokeless tobacco: Never Used   Substance and Sexual Activity     Alcohol use: Yes     Alcohol/week: 7.0 standard drinks     Types: 7 Glasses of wine per week     Drug use: No     Sexual activity: Not on file   Lifestyle     Physical activity:     Days per week: Not on file     Minutes per session: Not on file     Stress: Not on file   Relationships     Social connections:     Talks on phone: Not on file     Gets together: Not on file     Attends Christianity service: Not on file     Active member of club or organization: Not on file     Attends meetings of clubs or organizations: Not on file     Relationship status: Not on file     Intimate partner violence:     Fear of current or ex partner: Not on file     Emotionally abused: Not on file     Physically abused: Not on file      Forced sexual activity: Not on file   Other Topics Concern     Incontinent Not Asked     Toileting independently Not Asked     Cognitive impairment Not Asked     Vision impairment Not Asked     Hearing impairment Not Asked     Dentures Not Asked   Social History Narrative     Not on file       REVIEW OF SYSTEM:  Pertinent items are noted in HPI.  A 12 point comprehensive review of systems was negative except as noted.    PHYSICAL EXAM:     General Appearance:    lethargic, cooperative, no distress, appears stated age; frail   Head:    Normocephalic, without obvious abnormality, atraumatic   Eyes:    PERRL, conjunctiva/corneas clear, both eyes; wears glasses   Ears:    Normal external ear canals, both ears   Nose:   Nares normal, septum midline, mucosa normal, no drainage    or sinus tenderness   Throat:   Lips, mucosa, and tongue normal; teeth and gums normal   Neck:   Supple, symmetrical, trachea midline, no adenopathy;     thyroid:  no enlargement/tenderness/nodules; no carotid    bruit or JVD   Back:     Symmetric, no curvature, ROM normal, no CVA tenderness   Lungs:     Clear to auscultation bilaterally, respirations unlabored   Chest Wall:    No tenderness or deformity    Heart:    Regular rate and rhythm, S1 and S2 normal, no murmur, rub   or gallop   Abdomen:     Soft, distended; diffuse tenderness to palpation, bowel sounds hyperactive all four quadrants, no masses, no organomegaly   Extremities:   Extremities normal, atraumatic, no cyanosis; BLE moderate nonpitting edema; severe erythema and tophi on bilateral great toes; tophi opened up on left third and fourth toes and right second toe   Pulses:   2+ and symmetric all extremities   Skin:   Skin color, texture, turgor normal, no rashes or lesions   Neurologic:   Oriented to person; forgetful at times; exhibits logical thought processes; generalized weakness; lying in bed during this visit         LABS:     Check BMP and CRP on 12/4/19.    ASSESSMENT:       ICD-10-CM    1. Physical deconditioning R53.81    2. SANTHOSH (acute kidney injury) (H) N17.9    3. C. difficile colitis A04.72        PLAN:      Physical deconditioning  -PT/OT as directed  -Discharge from facility per therapy recommendations  -Discharge home anticipated for 12/6/19 with home health services unsure at this time    C. Difficile colitis  -START vancomycin 125 mg every six hours x 14 days  -Continue Flagyl 500 mg Q8H x 10 days for presumed infectious colitis  -Continue Florastor 250 mg two times a day  -Continue Culturelle as prescribed  -Notify provider if patient has s/s of systemic infection including fever, chills, night sweats     SANTHOSH  -Check BMP tomorrow 12/4/19  -Creatinine 2.8 today  -STAT 1 L NS over 8 hours per PIV    Otherwise continue current care plan for all other chronic medical conditions, as they are stable. Encouraged patient to engage in healthy lifestyle behaviors such as engaging in social activities, exercising (PT/OT), eating well, and following care plan. Follow up for routine check-up, or sooner if needed. Will continue to monitor patient and work with nursing staff collaboratively to work toward positive patient outcomes.     Electronically signed by: Mansi Fitzpatrick CNP     Total floor/unit time was 60 minutes and 45 minutes was spent in counseling patient and her daughter on bowel management, SANTHOSH management, and discharge potential in coordination of care with nursing staff and SW.

## 2021-06-09 ENCOUNTER — RECORDS - HEALTHEAST (OUTPATIENT)
Dept: ADMINISTRATIVE | Facility: CLINIC | Age: 86
End: 2021-06-09

## 2021-06-12 NOTE — PROGRESS NOTES
Office Visit - Follow up    Janice Gaspar   88 y.o. female    Date of Visit: 8/3/2017    Chief Complaint   Patient presents with     Hip Pain     For 1 week       Subjective: Pleasant patient who is a resident of an assisted living facility who reportedly fell on her left hip approximately 7/29/2017.    The on-call physician was contacted and a hip x-ray was performed    I do not have the official report but did speak with nursing personnel at her assisted living facility.  There was no evidence of obvious fracture or displacement although slight irregularity was noted that indicated a fracture could not be ruled out.  A MRI was suggested if high index of suspicion for fracture.    Patient had been feeling well however slipped in her apartments falling and striking her left hip.  She had had modest pain since that time.  She has a been able to ambulate although has been using a walker recently because of hip pain    She has some pain with weightbearing but most of the pain is with lifting her leg and moving it for regular ambulation    There was minimal if any bruising.  Otherwise has felt well but is concerned about the hip pain.    Previous history reviewed no recent cardiac symptoms    ROS: A comprehensive review of systems was performed and was otherwise negative except as mentioned above.     Exam  I was able to observe her ambulating with a walker gait was stable she was favoring her left hip.  Mild Obed antalgic gait.    Range of motion of the hip is well preserved there is point tenderness over the greater trochanter.       BP (!) 162/100  Pulse 78    Assessment and Plan  Hip contusion with x-rays suggesting no acute fracture however irregularity as noted by radiologist noted.    I will arrange for physical therapy for ambulation and gait stability as well as education and use of walker.  I will have her continue with weightbearing with walker use whenever possible.  Will follow up with physical therapist and  discussed with nursing staff.    Treat symptomatically with diclofenac.  Follow-up by phone in the next few days with nursing staff.  If pain persists and is disabling and not improving as one might expect from a contusion consider further imaging    Janice was seen today for hip pain.    Diagnoses and all orders for this visit:    Contusion of left hip    Vasovagal Syncope    NSTEMI (non-ST elevated myocardial infarction)    Ischemic cardiomyopathy    Hyperglycemia    Other orders  -     diclofenac (VOLTAREN) 50 MG EC tablet; Take 1 tablet (50 mg total) by mouth 2 (two) times a day.          Time: total time spent with the patient was 45 minutes of which >50% was spent in counseling and coordination of care        No Known Allergies    Medications :  Prior to Admission medications    Medication Sig Start Date End Date Taking? Authorizing Provider   aspirin 81 MG EC tablet TAKE 1 TABLET BY MOUTH ONCE DAILY 3/14/17  Yes Enmanuel Schwartz MD   atorvastatin (LIPITOR) 40 MG tablet TAKE 1 TABLET BY MOUTH ONCE DAILY 2/9/17  Yes Enmanuel Schwartz MD   ferrous gluconate 325 mg (36 mg iron) Tab TAKE 1 TABLET BY MOUTH ONCE DAILY 4/7/16  Yes Guy Jaramillo MD   furosemide (LASIX) 40 MG tablet TAKE 1 TABLET BY MOUTH ONCE DAILY 3/14/17  Yes Enmanuel Schwartz MD   lisinopril (PRINIVIL,ZESTRIL) 5 MG tablet TAKE 1 TABLET BY MOUTH ONCE DAILY 3/14/17  Yes Enmanuel Schwartz MD   MAPAP EXTRA STRENGTH 500 mg tablet TAKE TWO TABLETS (1000MG) BY MOUTH EVERY 6 HOURS AS NEEDED FOR NECK PAIN 7/3/17  Yes Enmanuel Schwartz MD   metoprolol succinate (TOPROL-XL) 50 MG 24 hr tablet TAKE 1 TABLET BY MOUTH ONCE DAILY 3/14/17  Yes Enmanuel Schwartz MD   multivitamin therapeutic (THERAGRAN) tablet Take 1 tablet by mouth daily.   Yes PROVIDER, HISTORICAL   ranitidine (ZANTAC) 300 MG tablet TAKE 1 TABLET BY MOUTH ONCE DAILY 4/5/17  Yes Enmanuel Schwartz MD   TAB-A-JACLYN per tablet TAKE 1 TABLET BY MOUTH ONCE DAILY 4/5/17  Yes Enmanuel Schwartz MD   diclofenac  (VOLTAREN) 50 MG EC tablet Take 1 tablet (50 mg total) by mouth 2 (two) times a day. 8/3/17   Enmanuel Schwartz MD   ferrous gluconate 324 mg (37.5 mg iron) Tab TAKE 1 TABLET BY MOUTH ONCE DAILY 3/20/17 8/3/17  Enmanuel Schwartz MD        Past Medical History:   Past Medical History:   Diagnosis Date     Anemia      Arthritis      CHF (congestive heart failure)     Acute, systolic     Coronary artery disease      Erosive gastritis     History of     History of non-ST elevation myocardial infarction (NSTEMI)      History of transfusion      Hyperlipidemia      Hypertension     Essential       Past Surgical History:   Past Surgical History:   Procedure Laterality Date     ESOPHAGOGASTRODUODENOSCOPY N/A 1/16/2016    Procedure: ESOPHAGOGASTRODUODENOSCOPY;  Surgeon: Cachorro Cuellar MD;  Location: [facility];  Service:        Social History:   Social History     Social History     Marital status:      Spouse name: N/A     Number of children: N/A     Years of education: N/A     Occupational History     Not on file.     Social History Main Topics     Smoking status: Never Smoker     Smokeless tobacco: Never Used     Alcohol use 4.2 oz/week     7 Glasses of wine per week     Drug use: No     Sexual activity: Not on file     Other Topics Concern     Not on file     Social History Narrative       Family History: No family history on file.      Enmanuel Schwartz MD

## 2021-06-16 PROBLEM — M10.9 ACUTE GOUT OF FOOT, UNSPECIFIED CAUSE, UNSPECIFIED LATERALITY: Status: ACTIVE | Noted: 2019-11-25

## 2021-06-16 PROBLEM — N17.9 AKI (ACUTE KIDNEY INJURY) (H): Status: ACTIVE | Noted: 2019-11-02

## 2021-06-16 PROBLEM — S91.109D OPEN WOUND OF TOE, SUBSEQUENT ENCOUNTER: Status: ACTIVE | Noted: 2019-11-25

## 2021-06-16 PROBLEM — I95.9 HYPOTENSION: Status: ACTIVE | Noted: 2019-12-08

## 2021-06-16 PROBLEM — K52.9 COLITIS: Status: ACTIVE | Noted: 2019-11-02

## 2021-06-16 PROBLEM — A04.72 C. DIFFICILE COLITIS: Status: ACTIVE | Noted: 2019-12-08

## 2021-06-16 PROBLEM — I50.9 CHRONIC CHF (CONGESTIVE HEART FAILURE) (H): Status: ACTIVE | Noted: 2019-12-08

## 2021-06-16 PROBLEM — R53.81 PHYSICAL DECONDITIONING: Status: ACTIVE | Noted: 2019-11-25

## 2021-06-19 NOTE — LETTER
Letter by Mansi Fitzpatrick CNP at      Author: Mansi Fitzpatrick CNP Service: -- Author Type: --    Filed:  Encounter Date: 11/8/2019 Status: Signed         Patient: Janice Gaspar   MR Number: [id number]   YOB: 1928   Date of Visit: 11/8/2019     [facility]    Facility:   [facility] [[id number]]   Code Status: DNR/DNI and POLST AVAILABLE      CHIEF COMPLAINT/REASON FOR VISIT:  Chief Complaint   Patient presents with   ? Review Of Multiple Medical Conditions       HISTORY:      HPI: Janice is a 89 y.o. female who was admitted to [facility] from 11/1/19-11/7/19 for colitis.  Janice  has a past medical history of Anemia, Arthritis, CHF (congestive heart failure) (H), Coronary artery disease, Erosive gastritis, History of non-ST elevation myocardial infarction (NSTEMI), History of transfusion, Hyperlipidemia, and Hypertension.  Janice has multiple dark stools prior to hospitalization with resulting dehydration and syncopal episode at home with fall.  She was treated with IV fluids, ciprofloxacin and Flagyl for infectious colitis with CT abdomen showing colitis extending from mid transverse colon through the rectum, large amount of stool in the rectum and borderline dilated small bowel loops with air-fluid levels.  Her diarrhea has since improved with loperamide.  She also had SANTHOSH upon hospital admission improved with IV hydration.  Janice was also started on prednisone during her inpatient stay for suspected gout with erythema on her bilateral 1st metatarsals.  She is currently at [facility] s/p acute rehabilitation.      Today Ms. Gaspar is being evaluated for a review of multiple medical conditions.  Her main concern at this visit is bilateral foot pain described as sharp, intermittent rated at 5-6/10.  She is currently taking prednisone, Tylenol, and diclofenac 1% gel for pain management at this time.  She denied diarrhea at this time.  Her blood  pressure has been well-controlled with amlodipine, furosemide, metoprolol, and lisinopril with -140s since TCU admission.  She has not started working with therapy yet at this time.  The patient denied lightheadedness, dizziness, breathing difficulty, chest pain, palpitations, constipation, urinary symptoms, and numbness or tingling in extremities.  Nursing staff denied any new concerns for the patient at this time.        Past Medical History:   Diagnosis Date   ? Anemia    ? Arthritis    ? CHF (congestive heart failure) (H)     Acute, systolic   ? Coronary artery disease    ? Erosive gastritis     History of   ? History of non-ST elevation myocardial infarction (NSTEMI)    ? History of transfusion    ? Hyperlipidemia    ? Hypertension     Essential             No family history on file.  Social History     Socioeconomic History   ? Marital status:      Spouse name: Not on file   ? Number of children: Not on file   ? Years of education: Not on file   ? Highest education level: Not on file   Occupational History   ? Not on file   Social Needs   ? Financial resource strain: Not on file   ? Food insecurity:     Worry: Not on file     Inability: Not on file   ? Transportation needs:     Medical: Not on file     Non-medical: Not on file   Tobacco Use   ? Smoking status: Never Smoker   ? Smokeless tobacco: Never Used   Substance and Sexual Activity   ? Alcohol use: Yes     Alcohol/week: 7.0 standard drinks     Types: 7 Glasses of wine per week   ? Drug use: No   ? Sexual activity: Not on file   Lifestyle   ? Physical activity:     Days per week: Not on file     Minutes per session: Not on file   ? Stress: Not on file   Relationships   ? Social connections:     Talks on phone: Not on file     Gets together: Not on file     Attends Rastafarian service: Not on file     Active member of club or organization: Not on file     Attends meetings of clubs or organizations: Not on file     Relationship status: Not on  file   ? Intimate partner violence:     Fear of current or ex partner: Not on file     Emotionally abused: Not on file     Physically abused: Not on file     Forced sexual activity: Not on file   Other Topics Concern   ? Not on file   Social History Narrative   ? Not on file       REVIEW OF SYSTEM:  Pertinent items are noted in HPI.  A 12 point comprehensive review of systems was negative except as noted.    PHYSICAL EXAM:     General Appearance:    Alert, cooperative, no distress, appears stated age; frail   Head:    Normocephalic, without obvious abnormality, atraumatic   Eyes:    PERRL, conjunctiva/corneas clear, both eyes   Ears:    Normal external ear canals, both ears   Nose:   Nares normal, septum midline, mucosa normal, no drainage    or sinus tenderness   Throat:   Lips, mucosa, and tongue normal; teeth and gums normal   Neck:   Supple, symmetrical, trachea midline, no adenopathy;     thyroid:  no enlargement/tenderness/nodules; no carotid    bruit or JVD   Back:     Symmetric, no curvature, ROM normal, no CVA tenderness   Lungs:     Clear to auscultation bilaterally, respirations unlabored   Chest Wall:    No tenderness or deformity    Heart:    Regular rate and rhythm, S1 and S2 normal, no murmur, rub   or gallop   Abdomen:     Soft, non-tender, bowel sounds active all four quadrants,     no masses, no organomegaly   Extremities:   Extremities normal, atraumatic, no cyanosis or edema; moderate erythema and tophi on bilateral great toes hot to touch   Pulses:   2+ and symmetric all extremities   Skin:   Skin color, texture, turgor normal, no rashes or lesions   Neurologic:   Oriented to person; forgetful at times; exhibits logical thought processes; generalized weakness         LABS:     Ordered BMP, Mg, and CBC on 11/11/19 for monitoring of chronic medical conditions.    ASSESSMENT:      ICD-10-CM    1. Physical deconditioning R53.81    2. Acute gout involving toe, unspecified cause, unspecified  laterality M10.9    3. Foot pain, bilateral M79.671     M79.672    4. SANTHOSH (acute kidney injury) (H) N17.9    5. Colitis K52.9    6. Essential hypertension I10    7. Atherosclerosis of coronary artery of native heart without angina pectoris, unspecified vessel or lesion type I25.10    8. Ischemic cardiomyopathy I25.5        PLAN:      Physical deconditioning  -PT/OT as directed  -Discharge from facility per therapy recommendations  -Discharge home pending at this time    Gout/Foot pain  -Continue prednisone 30 mg daily  -INCREASE Tylenol from 1 g three times a day to four times a day  -INCREASE diclofenac 1% gel from two times a day to three times a day  -Encouraged patient to utilize cold therapy three times a day and prn  -Encouraged patient to utilize integrative therapies such as distraction and deep breathing for pain management  -Notify provider if patient has new or worsening pain      Hypertension/CAD/Ischemic cardiomyopathy  -Encouraged cardiac diet, low sodium diet, exercise and stress reduction techniques.   -Emphasized importance of blood pressure control.   -Continue current medications as prescribed.   -Notify provider if pt's SBP<90 or >180 and DBP <50 or >100     SANTHOSH  -Check BMP and Mg on 11/11/19    Colitis  -Continue lactobacillus as prescribed  -Notify provider if patient has s/s of systemic infection including fever, chills, night sweats     Otherwise continue current care plan for all other chronic medical conditions, as they are stable. Encouraged patient to engage in healthy lifestyle behaviors such as engaging in social activities, exercising (PT/OT), eating well, and following care plan. Follow up for routine check-up, or sooner if needed. Will continue to monitor patient and work with nursing staff collaboratively to work toward positive patient outcomes.     Electronically signed by: Mansi Fitzpatrick CNP     Total floor/unit time was 60 minutes and 40 minutes was spent in counseling  patient on gout management, pain management, and acute rehabilitation and coordination of care with nursing staff and SW.

## 2021-06-19 NOTE — LETTER
Letter by Mansi Fitzpatrick CNP at      Author: Mansi Fitzpatrick CNP Service: -- Author Type: --    Filed:  Encounter Date: 11/15/2019 Status: Signed         Patient: Janice Gaspar   MR Number: [id number]   YOB: 1928   Date of Visit: 11/15/2019     [facility]    Facility:   [facility] [[id number]]   Code Status: DNR/DNI and POLST AVAILABLE      CHIEF COMPLAINT/REASON FOR VISIT:  Chief Complaint   Patient presents with   ? Review Of Multiple Medical Conditions       HISTORY:      HPI: Janice is a 89 y.o. female who was admitted to [facility] from 11/1/19-11/7/19 for colitis.  Janice  has a past medical history of Anemia, Arthritis, CHF (congestive heart failure) (H), Coronary artery disease, Erosive gastritis, History of non-ST elevation myocardial infarction (NSTEMI), History of transfusion, Hyperlipidemia, and Hypertension.  Janice has multiple dark stools prior to hospitalization with resulting dehydration and syncopal episode at home with fall.  She was treated with IV fluids, ciprofloxacin and Flagyl for infectious colitis with CT abdomen showing colitis extending from mid transverse colon through the rectum, large amount of stool in the rectum and borderline dilated small bowel loops with air-fluid levels.  Her diarrhea has since improved with loperamide.  She also had SANTHOSH upon hospital admission improved with IV hydration.  Janice was also started on prednisone during her inpatient stay for suspected gout with erythema on her bilateral 1st metatarsals.  She is currently at [facility] s/p acute rehabilitation.      Today Ms. Gaspar is being evaluated for a review of multiple medical conditions.  ROS is limited due to patient history of cognitive impairment.  Her CRP level today was improved at 1.6 from 4.6 on 11/12/19 with no pain in her bilateral feet at this visit taking prednisone and colchicine for acute gout treatment.  Her hemoglobin was  improved from 8.5 on 11/11/19 to 10.1 today taking ferrous gluconate for iron supplementation.  Her potassium was low today at 3.2 and she is not currently taking any potassium supplementation.  She has been working with therapy and continues to have confusion limiting her participation according to notes.  The patient denied lightheadedness, dizziness, breathing difficulty, chest pain, palpitations, constipation, urinary symptoms, numbness or tingling in extremities, and pain.  Nursing staff denied any new concerns for the patient at this time.      Past Medical History:   Diagnosis Date   ? Anemia    ? Arthritis    ? CHF (congestive heart failure) (H)     Acute, systolic   ? Coronary artery disease    ? Erosive gastritis     History of   ? History of non-ST elevation myocardial infarction (NSTEMI)    ? History of transfusion    ? Hyperlipidemia    ? Hypertension     Essential             History reviewed. No pertinent family history.  Social History     Socioeconomic History   ? Marital status:      Spouse name: None   ? Number of children: None   ? Years of education: None   ? Highest education level: None   Occupational History   ? None   Social Needs   ? Financial resource strain: None   ? Food insecurity:     Worry: None     Inability: None   ? Transportation needs:     Medical: None     Non-medical: None   Tobacco Use   ? Smoking status: Never Smoker   ? Smokeless tobacco: Never Used   Substance and Sexual Activity   ? Alcohol use: Yes     Alcohol/week: 7.0 standard drinks     Types: 7 Glasses of wine per week   ? Drug use: No   ? Sexual activity: None   Lifestyle   ? Physical activity:     Days per week: None     Minutes per session: None   ? Stress: None   Relationships   ? Social connections:     Talks on phone: None     Gets together: None     Attends Catholic service: None     Active member of club or organization: None     Attends meetings of clubs or organizations: None     Relationship status:  None   ? Intimate partner violence:     Fear of current or ex partner: None     Emotionally abused: None     Physically abused: None     Forced sexual activity: None   Other Topics Concern   ? Incontinent Not Asked   ? Toileting independently Not Asked   ? Cognitive impairment Not Asked   ? Vision impairment Not Asked   ? Hearing impairment Not Asked   ? Dentures Not Asked   Social History Narrative   ? None       REVIEW OF SYSTEM:  Pertinent items are noted in HPI.  A 12 point comprehensive review of systems was negative except as noted.    PHYSICAL EXAM:     General Appearance:    Alert, cooperative, no distress, appears stated age; frail   Head:    Normocephalic, without obvious abnormality, atraumatic   Eyes:    PERRL, conjunctiva/corneas clear, both eyes; wears glasses   Ears:    Normal external ear canals, both ears   Nose:   Nares normal, septum midline, mucosa normal, no drainage    or sinus tenderness   Throat:   Lips, mucosa, and tongue normal; teeth and gums normal   Neck:   Supple, symmetrical, trachea midline, no adenopathy;     thyroid:  no enlargement/tenderness/nodules; no carotid    bruit or JVD   Back:     Symmetric, no curvature, ROM normal, no CVA tenderness   Lungs:     Clear to auscultation bilaterally, respirations unlabored   Chest Wall:    No tenderness or deformity    Heart:    Regular rate and rhythm, S1 and S2 normal, no murmur, rub   or gallop   Abdomen:     Soft, non-tender, bowel sounds active all four quadrants,     no masses, no organomegaly   Extremities:   Extremities normal, atraumatic, no cyanosis; BLE moderate nonpitting edema; severe erythema and tophi on bilateral great toes hot to touch; tophi on left third toe scabbed over   Pulses:   2+ and symmetric all extremities   Skin:   Skin color, texture, turgor normal, no rashes or lesions   Neurologic:   Oriented to person; forgetful at times; exhibits logical thought processes; generalized weakness         LABS:     Check  potassium level on 11/18/19.    ASSESSMENT:      ICD-10-CM    1. Physical deconditioning R53.81    2. Anemia, unspecified type D64.9    3. Hypokalemia E87.6    4. Acute gout of foot, unspecified cause, unspecified laterality M10.9        PLAN:      Physical deconditioning  -PT/OT as directed  -Discharge from facility per therapy recommendations  -Discharge home pending at this time    Gout  Lab Results   Component Value Date    CRP 1.6 (H) 11/15/2019   -Continue prednisone 40 mg daily x 5 days  -Continue Tylenol from 1 g three times a day to four times a day  -Continue diclofenac 1% gel from two times a day to three times a day  -Continue Scheduled colchicine 0.6 mg tab 2 tabs in AM and 1 tab in PM  -Encouraged patient to utilize cold therapy three times a day and prn  -Encouraged patient to utilize integrative therapies such as distraction and deep breathing for pain management  -Notify provider if patient has new or worsening pain      Anemia  Lab Results   Component Value Date    HGB 10.1 (L) 11/15/2019   -Continue ferrous gluconate 324 mg daily    Hypokalemia  -START potassium chloride 10 meq daily  -Check potassium level on 11/18/19    Otherwise continue current care plan for all other chronic medical conditions, as they are stable. Encouraged patient to engage in healthy lifestyle behaviors such as engaging in social activities, exercising (PT/OT), eating well, and following care plan. Follow up for routine check-up, or sooner if needed. Will continue to monitor patient and work with nursing staff collaboratively to work toward positive patient outcomes.     Electronically signed by: Mansi Fitzpatrick CNP

## 2021-06-19 NOTE — LETTER
Letter by Jana Waller MD at      Author: Jana Waller MD Service: -- Author Type: --    Filed:  Encounter Date: 11/12/2019 Status: Signed         Patient: Janice Gaspar   MR Number: [id number]   YOB: 1928   Date of Visit: 11/12/2019     [facility]  Initial MD Visit  11/12/19        Facility:   [facility] [[id number]]     Code Status: FULL CODE    Chief Complaint   Patient presents with   ? H & P       HPI:   Janice Gaspar is a 89 y.o. female who was seen at [facility] on  11/12/19 for an initial visit. Medical history is notable for CAD,  HTN, CHF, recurrent syncope and gastritis/GI bleed.  She was hospitalized at [facility] from 11/1/19 to 11/7/19 where she presented after a fall. She was found to have a non specific colitis and was treated with cipro/flagyl. Also started on a prednisone burst for acute gout of L first MTP. She was admitted to this facility for medical management and rehab.     Today, Ms. Gaspar is seen in her room. Her labs are back today with an elevated uric acid. She still has pain in her feet, but she says it is a bit better. Her toes and feet were wrapped in gauze and ACE wraps and I was unable to see them. No chest pain or dyspnea. No abdominal pain. No diarrhea or constipation. No black or dark stools. Discussed her lower Hgb, history of gastritis and the prednisone. Sleeping OK. Appetite is OK. Working with therapies.     ALLERGIES:  No Known Allergies    PAST MEDICAL HISTORY:  Past Medical History:   Diagnosis Date   ? Anemia    ? Arthritis    ? CHF (congestive heart failure) (H)     Acute, systolic   ? Coronary artery disease    ? Erosive gastritis     History of   ? History of non-ST elevation myocardial infarction (NSTEMI)    ? History of transfusion    ? Hyperlipidemia    ? Hypertension     Essential               PAST SURGICAL HISTORY:  Past Surgical History:   Procedure Laterality Date   ?  ESOPHAGOGASTRODUODENOSCOPY N/A 1/16/2016    Procedure: ESOPHAGOGASTRODUODENOSCOPY;  Surgeon: Cachorro Cuellar MD;  Location: [facility];  Service:        FAMILY HISTORY:  Reviewed and non contributory    SOCIAL HISTORY:  Lives in assisted living     MEDICATIONS:  Current Outpatient Medications   Medication Sig   ? acetaminophen (TYLENOL) 500 MG tablet Take 1,000 mg by mouth every 6 (six) hours.          ? amLODIPine (NORVASC) 5 MG tablet Take 5 mg by mouth daily.   ? aspirin 81 MG EC tablet TAKE 1 TABLET BY MOUTH ONCE DAILY (Patient taking differently: Take 81 mg by mouth daily.       )   ? atorvastatin (LIPITOR) 40 MG tablet TAKE 1 TABLET BY MOUTH ONCE DAILY (Patient taking differently: Take 40 mg by mouth at bedtime.       )   ? buPROPion (WELLBUTRIN SR) 100 MG 12 hr tablet Take 100 mg by mouth daily.   ? cholecalciferol, vitamin D3, 1,000 unit (25 mcg) tablet Take 1,000 Units by mouth daily.   ? diclofenac sodium (VOLTAREN) 1 % Gel Apply 8 g topically 2 (two) times a day. Apply to 4gm to  both hips and 4 gms to lower back         ? ferrous gluconate 324 mg (37.5 mg iron) Tab TAKE 1 TABLET BY MOUTH ONCE DAILY   ? folic acid (FOLVITE) 1 MG tablet Take 1 mg by mouth daily.   ? furosemide (LASIX) 20 MG tablet Take 20 mg by mouth daily.   ? Lactobacillus rhamnosus GG (CULTURELLE) 15 billion cell CpSP Take 1 capsule by mouth 2 (two) times a day with meals.   ? lisinopril (PRINIVIL,ZESTRIL) 5 MG tablet Take 7.5 mg by mouth daily.   ? metoprolol succinate (TOPROL-XL) 100 MG 24 hr tablet Take 100 mg by mouth daily.   ? mirtazapine (REMERON) 7.5 MG tablet Take 7.5 mg by mouth at bedtime.   ? pantoprazole (PROTONIX) 40 MG tablet Take 40 mg by mouth 2 (two) times a day.   ? predniSONE (DELTASONE) 20 MG tablet Take 40 mg by mouth daily x5 days. 11/13-11/17 .   ? ranitidine (ZANTAC) 300 MG tablet TAKE 1 TABLET BY MOUTH ONCE DAILY (Patient taking differently: Take 300 mg by mouth at bedtime.       )        ROS:  10 point ROS  neg other than the symptoms noted above in the HPI.    PHYSICAL EXAM:  /77   Pulse 69   Temp 98.6  F (37  C)   Resp 18   Wt 122 lb 11.2 oz (55.7 kg)   BMI 22.44 kg/m      Gen: laying in bed, alert, cooperative and in no acute distress  HEENT: normocephalic; oropharynx clear  Card: RRR, S1, S2, no murmurs  Resp: lungs clear to auscultation bilaterally, no crackles or wheezes  GI: abdomen soft, not-tender  MSK: normal muscle tone, no LE edema  Neuro: CX II-XII grossly in tact; ROM in all four extremities grossly in tact  Psych: alert and oriented to self; normal affect  Skin: dressing of gauze and ACE wraps c/d/i; I did not take down this afternoon    LABORATORY/IMAGING DATA:  Reviewed as per Epic    ASSESSMENT/PLAN:    Colitis  Completed course of cipro and metronidazole. Afebrile. Denies any diarrhea or abdominal pain.   -- follow clinically    Acute Gout of Left First MTP  Uric acid is elevated to 8.7 today.   -- analgesia with APAP 1000 mg q6h  -- started on prednisone 30 mg daily x5 days - last day tomorrow 11/13   -- will go ahead and increase to 40 mg daily starting tomorrow x5  additional days  -- started on colchicine yesterday    Fe Deficiency Anemia  Acute Anemia   Baseline Hgb 10-11. Hgb has drifted down to 8.5. History of gastritis and GI bleed. She's been on prednisone and now on colchicine. Denies any black or dark stools.   -- ferrous gluconate 324 mg daily  -- start pantoprazole 40 mg two times a day  -- repeat Hgb to ensure not spurious      CAD, Diastolic CHF (EF 60%), HTN, HLD  Mild Aortic Stenosis   SBPs 120s-140s. Weights stable at 123 lbs.   -- amlodipine 5 mg daily, ASA 81 mg daily, atorvastatin 40 mg daily, furosemide 20 mg daily, lisinopril 7.5 mg daily and metoprolol  mg d aily  -- follow BPs and adjust medications as needed    Cognitive Impairment  SBT 21/26 in the hospital. SLUMS 15/30 at [facility]. Lives in an AL facility  -- OT following for cog assessment     Depression /  Anxiety  Mood and spirits good today  -- bupropion 100 mg daily and mirtazapine 7.5 mg at bedtime   -- supportive cares    Gastritis  History of associated GI bleed.   -- adding a two times a day PPI today as above  -- ranitidine 300 mg at bedtime     Falls  Physical Deconditioning  In setting of hospitalization and underlying medical conditions  -- ongoing PT/OT    Electronically signed by: Jana Waller MD

## 2021-06-19 NOTE — LETTER
Letter by Mansi Fitzpatrick CNP at      Author: Mansi Fitzpatrick CNP Service: -- Author Type: --    Filed:  Encounter Date: 11/13/2019 Status: Signed         Patient: Janice Gaspar   MR Number: [id number]   YOB: 1928   Date of Visit: 11/13/2019     [facility]    Facility:   [facility] [[id number]]   Code Status: DNR/DNI and POLST AVAILABLE      CHIEF COMPLAINT/REASON FOR VISIT:  Chief Complaint   Patient presents with   ? Problem Visit     gout       HISTORY:      HPI: Janice is a 89 y.o. female who was admitted to [facility] from 11/1/19-11/7/19 for colitis.  Janice  has a past medical history of Anemia, Arthritis, CHF (congestive heart failure) (H), Coronary artery disease, Erosive gastritis, History of non-ST elevation myocardial infarction (NSTEMI), History of transfusion, Hyperlipidemia, and Hypertension.  Janice has multiple dark stools prior to hospitalization with resulting dehydration and syncopal episode at home with fall.  She was treated with IV fluids, ciprofloxacin and Flagyl for infectious colitis with CT abdomen showing colitis extending from mid transverse colon through the rectum, large amount of stool in the rectum and borderline dilated small bowel loops with air-fluid levels.  Her diarrhea has since improved with loperamide.  She also had SANTHOSH upon hospital admission improved with IV hydration.  Janice was also started on prednisone during her inpatient stay for suspected gout with erythema on her bilateral 1st metatarsals.  She is currently at [facility] s/p acute rehabilitation.      Today Ms. Gaspar is being evaluated for acute gout.  ROS is limited due to patient history of cognitive impairment.  Nursing staff noted that the patient continues to have pain in her BLE due to acute gout with moderate erythema and moderate nonpitting edema.  The patient had one of the tophi on her left third toe open up yesterday and it has  since scabbed over.  Plan to discontinue kerlix dressing to left foot to minimize layers on foot due to pain.  Janice described her pain as sharp, intermittent pain in her bilateral feet rated 6/10 with palpation and ambulation.  She was started on prednisone 40 mg yesterday increased from 30 mg daily started during her hospitalization.  She has been taking colchicine 1.2 mg tab and nursing staff reported that they have not been giving her second daily dose of 0.6 mg available for pain management.  She is also taking Tylenol and diclofenac gel for pain management.  She said that she has been able to tolerate therapy without any difficulty.  The patient denied lightheadedness, dizziness, breathing difficulty, chest pain, palpitations, constipation, urinary symptoms, and numbness or tingling in extremities.        Past Medical History:   Diagnosis Date   ? Anemia    ? Arthritis    ? CHF (congestive heart failure) (H)     Acute, systolic   ? Coronary artery disease    ? Erosive gastritis     History of   ? History of non-ST elevation myocardial infarction (NSTEMI)    ? History of transfusion    ? Hyperlipidemia    ? Hypertension     Essential             History reviewed. No pertinent family history.  Social History     Socioeconomic History   ? Marital status:      Spouse name: None   ? Number of children: None   ? Years of education: None   ? Highest education level: None   Occupational History   ? None   Social Needs   ? Financial resource strain: None   ? Food insecurity:     Worry: None     Inability: None   ? Transportation needs:     Medical: None     Non-medical: None   Tobacco Use   ? Smoking status: Never Smoker   ? Smokeless tobacco: Never Used   Substance and Sexual Activity   ? Alcohol use: Yes     Alcohol/week: 7.0 standard drinks     Types: 7 Glasses of wine per week   ? Drug use: No   ? Sexual activity: None   Lifestyle   ? Physical activity:     Days per week: None     Minutes per session: None    ? Stress: None   Relationships   ? Social connections:     Talks on phone: None     Gets together: None     Attends Protestant service: None     Active member of club or organization: None     Attends meetings of clubs or organizations: None     Relationship status: None   ? Intimate partner violence:     Fear of current or ex partner: None     Emotionally abused: None     Physically abused: None     Forced sexual activity: None   Other Topics Concern   ? Incontinent Not Asked   ? Toileting independently Not Asked   ? Cognitive impairment Not Asked   ? Vision impairment Not Asked   ? Hearing impairment Not Asked   ? Dentures Not Asked   Social History Narrative   ? None       REVIEW OF SYSTEM:  Pertinent items are noted in HPI.  A 12 point comprehensive review of systems was negative except as noted.    PHYSICAL EXAM:     General Appearance:    Alert, cooperative, no distress, appears stated age; frail   Head:    Normocephalic, without obvious abnormality, atraumatic   Eyes:    PERRL, conjunctiva/corneas clear, both eyes   Ears:    Normal external ear canals, both ears   Nose:   Nares normal, septum midline, mucosa normal, no drainage    or sinus tenderness   Throat:   Lips, mucosa, and tongue normal; teeth and gums normal   Neck:   Supple, symmetrical, trachea midline, no adenopathy;     thyroid:  no enlargement/tenderness/nodules; no carotid    bruit or JVD   Back:     Symmetric, no curvature, ROM normal, no CVA tenderness   Lungs:     Clear to auscultation bilaterally, respirations unlabored   Chest Wall:    No tenderness or deformity    Heart:    Regular rate and rhythm, S1 and S2 normal, no murmur, rub   or gallop   Abdomen:     Soft, non-tender, bowel sounds active all four quadrants,     no masses, no organomegaly   Extremities:   Extremities normal, atraumatic, no cyanosis; BLE moderate nonpitting edema; severe erythema and tophi on bilateral great toes hot to touch; tophi on left third toe scabbed over    Pulses:   2+ and symmetric all extremities   Skin:   Skin color, texture, turgor normal, no rashes or lesions   Neurologic:   Oriented to person; forgetful at times; exhibits logical thought processes; generalized weakness         LABS:     Lab Results   Component Value Date    WBC 5.7 11/15/2019    HGB 10.1 (L) 11/15/2019    HCT 33.2 (L) 11/15/2019     11/15/2019    CHOL 138 01/11/2016    TRIG 83 01/11/2016    HDL 56 01/11/2016    ALT 28 11/15/2019    AST 33 11/15/2019     11/15/2019    K 3.2 (L) 11/15/2019     (H) 11/15/2019    CREATININE 0.92 11/15/2019    BUN 25 11/15/2019    CO2 19 (L) 11/15/2019    TSH 2.04 09/05/2017    INR 1.07 11/02/2019        ASSESSMENT:      ICD-10-CM    1. Physical deconditioning R53.81    2. Acute gout of foot, unspecified cause, unspecified laterality M10.9    3. Bilateral foot pain M79.671     M79.672        PLAN:      Physical deconditioning  -PT/OT as directed  -Discharge from facility per therapy recommendations  -Discharge home pending at this time    Gout/Foot pain  -Continue prednisone 40 mg daily x 5 days  -Continue Tylenol from 1 g three times a day to four times a day  -Continue diclofenac 1% gel from two times a day to three times a day  -START Scheduled colchicine 0.6 mg tab 2 tabs in AM and 1 tab in PM  -Check CRP on 11/15/19  -Encouraged patient to utilize cold therapy three times a day and prn  -Encouraged patient to utilize integrative therapies such as distraction and deep breathing for pain management  -Notify provider if patient has new or worsening pain      Otherwise continue current care plan for all other chronic medical conditions, as they are stable. Encouraged patient to engage in healthy lifestyle behaviors such as engaging in social activities, exercising (PT/OT), eating well, and following care plan. Follow up for routine check-up, or sooner if needed. Will continue to monitor patient and work with nursing staff collaboratively to work  toward positive patient outcomes.     Electronically signed by: Mansi Fitzpatrick, CNP

## 2021-06-19 NOTE — LETTER
Letter by Mansi Fitzpatrick CNP at      Author: Mansi Fitzpatrick CNP Service: -- Author Type: --    Filed:  Encounter Date: 11/14/2019 Status: Signed         Patient: Janice Gaspar   MR Number: [id number]   YOB: 1928   Date of Visit: 11/14/2019     [facility]    Facility:   [facility] [[id number]]   Code Status: DNR/DNI and POLST AVAILABLE      CHIEF COMPLAINT/REASON FOR VISIT:  Chief Complaint   Patient presents with   ? Review Of Multiple Medical Conditions       HISTORY:      HPI: Janice is a 89 y.o. female who was admitted to [facility] from 11/1/19-11/7/19 for colitis.  Janice  has a past medical history of Anemia, Arthritis, CHF (congestive heart failure) (H), Coronary artery disease, Erosive gastritis, History of non-ST elevation myocardial infarction (NSTEMI), History of transfusion, Hyperlipidemia, and Hypertension.  Janice has multiple dark stools prior to hospitalization with resulting dehydration and syncopal episode at home with fall.  She was treated with IV fluids, ciprofloxacin and Flagyl for infectious colitis with CT abdomen showing colitis extending from mid transverse colon through the rectum, large amount of stool in the rectum and borderline dilated small bowel loops with air-fluid levels.  Her diarrhea has since improved with loperamide.  She also had SANTHOSH upon hospital admission improved with IV hydration.  Janice was also started on prednisone during her inpatient stay for suspected gout with erythema on her bilateral 1st metatarsals.  She is currently at [facility] s/p acute rehabilitation.      Today Ms. Gaspar is being evaluated for a review of multiple medical conditions.  ROS is limited due to patient history of cognitive impairment.  Nursing staff reported that Janice did not get out of bed this morning for breakfast and she is reporting increased fatigue.  She denied pain at this visit taking prednisone and colchicine  for gout exacerbation at this time.  Her last CRP was elevated at 4.6 and uric acid level at 8.6 on 11/12/19 taking 30 mg prednisone for treatment of gout.  Her last hemoglobin was low on 11/11/19 at 8.5 which could be contributing to her fatigue at this time taking ferrous gluconate for supplementation.  She was agreeable to further monitoring of labs to evaluate her increased fatigue today.  The patient denied lightheadedness, dizziness, breathing difficulty, chest pain, palpitations, constipation, urinary symptoms, numbness or tingling in extremities, and pain.  Nursing staff noted that her daughter was called and said that the patient sleeps in a few days per week at baseline.        Past Medical History:   Diagnosis Date   ? Anemia    ? Arthritis    ? CHF (congestive heart failure) (H)     Acute, systolic   ? Coronary artery disease    ? Erosive gastritis     History of   ? History of non-ST elevation myocardial infarction (NSTEMI)    ? History of transfusion    ? Hyperlipidemia    ? Hypertension     Essential             History reviewed. No pertinent family history.  Social History     Socioeconomic History   ? Marital status:      Spouse name: None   ? Number of children: None   ? Years of education: None   ? Highest education level: None   Occupational History   ? None   Social Needs   ? Financial resource strain: None   ? Food insecurity:     Worry: None     Inability: None   ? Transportation needs:     Medical: None     Non-medical: None   Tobacco Use   ? Smoking status: Never Smoker   ? Smokeless tobacco: Never Used   Substance and Sexual Activity   ? Alcohol use: Yes     Alcohol/week: 7.0 standard drinks     Types: 7 Glasses of wine per week   ? Drug use: No   ? Sexual activity: None   Lifestyle   ? Physical activity:     Days per week: None     Minutes per session: None   ? Stress: None   Relationships   ? Social connections:     Talks on phone: None     Gets together: None     Attends Christian  service: None     Active member of club or organization: None     Attends meetings of clubs or organizations: None     Relationship status: None   ? Intimate partner violence:     Fear of current or ex partner: None     Emotionally abused: None     Physically abused: None     Forced sexual activity: None   Other Topics Concern   ? Incontinent Not Asked   ? Toileting independently Not Asked   ? Cognitive impairment Not Asked   ? Vision impairment Not Asked   ? Hearing impairment Not Asked   ? Dentures Not Asked   Social History Narrative   ? None       REVIEW OF SYSTEM:  Pertinent items are noted in HPI.  A 12 point comprehensive review of systems was negative except as noted.    PHYSICAL EXAM:     General Appearance:    Alert, cooperative, no distress, appears stated age; frail   Head:    Normocephalic, without obvious abnormality, atraumatic   Eyes:    PERRL, conjunctiva/corneas clear, both eyes   Ears:    Normal external ear canals, both ears   Nose:   Nares normal, septum midline, mucosa normal, no drainage    or sinus tenderness   Throat:   Lips, mucosa, and tongue normal; teeth and gums normal   Neck:   Supple, symmetrical, trachea midline, no adenopathy;     thyroid:  no enlargement/tenderness/nodules; no carotid    bruit or JVD   Back:     Symmetric, no curvature, ROM normal, no CVA tenderness   Lungs:     Clear to auscultation bilaterally, respirations unlabored   Chest Wall:    No tenderness or deformity    Heart:    Regular rate and rhythm, S1 and S2 normal, no murmur, rub   or gallop   Abdomen:     Soft, non-tender, bowel sounds active all four quadrants,     no masses, no organomegaly   Extremities:   Extremities normal, atraumatic, no cyanosis; BLE moderate nonpitting edema; severe erythema and tophi on bilateral great toes hot to touch; tophi on left third toe scabbed over   Pulses:   2+ and symmetric all extremities   Skin:   Skin color, texture, turgor normal, no rashes or lesions   Neurologic:    Oriented to person; forgetful at times; exhibits logical thought processes; generalized weakness         LABS:     Ordered CMP, CBC, and CRP for tomorrow 11/15/19.     ASSESSMENT:      ICD-10-CM    1. Anemia, unspecified type D64.9    2. Physical deconditioning R53.81    3. Acute gout of foot, unspecified cause, unspecified laterality M10.9    4. Bilateral foot pain M79.671     M79.672    5. Fatigue due to excessive exertion, initial encounter T73.3XXA        PLAN:      Physical deconditioning  -PT/OT as directed  -Discharge from facility per therapy recommendations  -Discharge home pending at this time    Gout/Foot pain  -Continue prednisone 40 mg daily x 5 days  -Continue Tylenol from 1 g three times a day to four times a day  -Continue diclofenac 1% gel from two times a day to three times a day  -Continue Scheduled colchicine 0.6 mg tab 2 tabs in AM and 1 tab in PM  -Check CRP on 11/15/19  -Encouraged patient to utilize cold therapy three times a day and prn  -Encouraged patient to utilize integrative therapies such as distraction and deep breathing for pain management  -Notify provider if patient has new or worsening pain      Anemia  -Check CBC on 11/15/19    Fatigue  -Check CMP on 11/15/19    Otherwise continue current care plan for all other chronic medical conditions, as they are stable. Encouraged patient to engage in healthy lifestyle behaviors such as engaging in social activities, exercising (PT/OT), eating well, and following care plan. Follow up for routine check-up, or sooner if needed. Will continue to monitor patient and work with nursing staff collaboratively to work toward positive patient outcomes.     Electronically signed by: Mansi Fitzpatrick, CNP

## 2021-06-19 NOTE — LETTER
Letter by Mansi Fitzpatrick CNP at      Author: Mansi Fitzpatrick CNP Service: -- Author Type: --    Filed:  Encounter Date: 11/21/2019 Status: Signed         Patient: Janice Gaspar   MR Number: [id number]   YOB: 1928   Date of Visit: 11/21/2019     [facility]    Facility:   [facility] [[id number]]   Code Status: DNR/DNI and POLST AVAILABLE      CHIEF COMPLAINT/REASON FOR VISIT:  Chief Complaint   Patient presents with   ? Review Of Multiple Medical Conditions       HISTORY:      HPI: Janice is a 89 y.o. female who was admitted to [facility] from 11/1/19-11/7/19 for colitis.  Janice  has a past medical history of Anemia, Arthritis, CHF (congestive heart failure) (H), Coronary artery disease, Erosive gastritis, History of non-ST elevation myocardial infarction (NSTEMI), History of transfusion, Hyperlipidemia, and Hypertension.  Janice has multiple dark stools prior to hospitalization with resulting dehydration and syncopal episode at home with fall.  She was treated with IV fluids, ciprofloxacin and Flagyl for infectious colitis with CT abdomen showing colitis extending from mid transverse colon through the rectum, large amount of stool in the rectum and borderline dilated small bowel loops with air-fluid levels.  Her diarrhea has since improved with loperamide.  She also had SANTHOSH upon hospital admission improved with IV hydration.  Janice was also started on prednisone during her inpatient stay for suspected gout with erythema on her bilateral 1st metatarsals.  She is currently at [facility] s/p acute rehabilitation.      Today Ms. Gaspar is being evaluated for a review of multiple medical conditions.  ROS is limited due to patient history of cognitive impairment.  Her pain in her bilateral feet has resolved at this time.  Her bilateral toe wounds are healing with decreased erythema and purulent drainage from tophi at this visit.  Her CRP was  elevated from her previous level at 2.2 today after completing prednisone two days ago.  Her uric acid continues to increase at 10.1 today from 8.6 on 11/12/19.  She has been able to ambulate short distances with therapy but has been refusing often due to fatigue.  The patient denied lightheadedness, dizziness, breathing difficulty, chest pain, palpitations, constipation, urinary symptoms, numbness or tingling in extremities, and pain.  Nursing staff denied any new concerns for the patient at this time.      Past Medical History:   Diagnosis Date   ? Anemia    ? Arthritis    ? CHF (congestive heart failure) (H)     Acute, systolic   ? Coronary artery disease    ? Erosive gastritis     History of   ? History of non-ST elevation myocardial infarction (NSTEMI)    ? History of transfusion    ? Hyperlipidemia    ? Hypertension     Essential             No family history on file.  Social History     Socioeconomic History   ? Marital status:      Spouse name: Not on file   ? Number of children: Not on file   ? Years of education: Not on file   ? Highest education level: Not on file   Occupational History   ? Not on file   Social Needs   ? Financial resource strain: Not on file   ? Food insecurity:     Worry: Not on file     Inability: Not on file   ? Transportation needs:     Medical: Not on file     Non-medical: Not on file   Tobacco Use   ? Smoking status: Never Smoker   ? Smokeless tobacco: Never Used   Substance and Sexual Activity   ? Alcohol use: Yes     Alcohol/week: 7.0 standard drinks     Types: 7 Glasses of wine per week   ? Drug use: No   ? Sexual activity: Not on file   Lifestyle   ? Physical activity:     Days per week: Not on file     Minutes per session: Not on file   ? Stress: Not on file   Relationships   ? Social connections:     Talks on phone: Not on file     Gets together: Not on file     Attends Confucianist service: Not on file     Active member of club or organization: Not on file     Attends  meetings of clubs or organizations: Not on file     Relationship status: Not on file   ? Intimate partner violence:     Fear of current or ex partner: Not on file     Emotionally abused: Not on file     Physically abused: Not on file     Forced sexual activity: Not on file   Other Topics Concern   ? Incontinent Not Asked   ? Toileting independently Not Asked   ? Cognitive impairment Not Asked   ? Vision impairment Not Asked   ? Hearing impairment Not Asked   ? Dentures Not Asked   Social History Narrative   ? Not on file       REVIEW OF SYSTEM:  Pertinent items are noted in HPI.  A 12 point comprehensive review of systems was negative except as noted.    PHYSICAL EXAM:     General Appearance:    Alert, cooperative, no distress, appears stated age; frail   Head:    Normocephalic, without obvious abnormality, atraumatic   Eyes:    PERRL, conjunctiva/corneas clear, both eyes; wears glasses   Ears:    Normal external ear canals, both ears   Nose:   Nares normal, septum midline, mucosa normal, no drainage    or sinus tenderness   Throat:   Lips, mucosa, and tongue normal; teeth and gums normal   Neck:   Supple, symmetrical, trachea midline, no adenopathy;     thyroid:  no enlargement/tenderness/nodules; no carotid    bruit or JVD   Back:     Symmetric, no curvature, ROM normal, no CVA tenderness   Lungs:     Clear to auscultation bilaterally, respirations unlabored   Chest Wall:    No tenderness or deformity    Heart:    Regular rate and rhythm, S1 and S2 normal, no murmur, rub   or gallop   Abdomen:     Soft, non-tender, bowel sounds active all four quadrants,     no masses, no organomegaly   Extremities:   Extremities normal, atraumatic, no cyanosis; BLE moderate nonpitting edema; severe erythema and tophi on bilateral great toes hot to touch; tophi opened up on left third and fourth toes and right second toe   Pulses:   2+ and symmetric all extremities   Skin:   Skin color, texture, turgor normal, no rashes or lesions    Neurologic:   Oriented to person; forgetful at times; exhibits logical thought processes; generalized weakness         LABS:     Lab Results   Component Value Date    WBC 5.7 11/15/2019    HGB 10.1 (L) 11/15/2019    HCT 33.2 (L) 11/15/2019     11/15/2019    CHOL 138 01/11/2016    TRIG 83 01/11/2016    HDL 56 01/11/2016    ALT 28 11/15/2019    AST 33 11/15/2019     11/21/2019    K 3.8 11/21/2019     (H) 11/21/2019    CREATININE 1.31 (H) 11/21/2019    BUN 23 11/21/2019    CO2 9 (LL) 11/21/2019    TSH 2.04 09/05/2017    INR 1.07 11/02/2019        ASSESSMENT:      ICD-10-CM    1. Physical deconditioning R53.81    2. Acute gout of foot, unspecified cause, unspecified laterality M10.9    3. Open wound of toe, subsequent encounter S91.109D        PLAN:      Physical deconditioning  -PT/OT as directed  -Discharge from facility per therapy recommendations  -Discharge home pending at this time    Gout  -Check CRP on 11/25/19  -START prednisone 40 mg x 2 days, 30 mg x 2 days, 20 mg x 2 days, and 10 mg x 2 days  -CHANGE Tylenol to 500 mg Q4H PRN  -DISCONTINUE diclofenac 1% gel from two times a day to three times a day  -Continue Scheduled colchicine 0.6 mg tab 2 tabs in AM and 1 tab in PM  -Encouraged patient to utilize cold therapy three times a day and prn  -Encouraged patient to utilize integrative therapies such as distraction and deep breathing for pain management  -Notify provider if patient has new or worsening pain      Multiple open toe wounds  -Continue medihoney to open toe wounds covered with telfa wrapped with kerlix and ace wraps  -Notify provider if patient has s/s of systemic infection including fever, chills, night sweats     Otherwise continue current care plan for all other chronic medical conditions, as they are stable. Encouraged patient to engage in healthy lifestyle behaviors such as engaging in social activities, exercising (PT/OT), eating well, and following care plan. Follow up for  routine check-up, or sooner if needed. Will continue to monitor patient and work with nursing staff collaboratively to work toward positive patient outcomes.     Electronically signed by: Mansi Fitzpatrick CNP

## 2021-06-19 NOTE — LETTER
Letter by Mansi Fitzpatrick CNP at      Author: Mansi Fitzpatrick CNP Service: -- Author Type: --    Filed:  Encounter Date: 11/18/2019 Status: Signed         Patient: Janice Gaspar   MR Number: [id number]   YOB: 1928   Date of Visit: 11/18/2019     [facility]    Facility:   [facility] [[id number]]   Code Status: DNR/DNI and POLST AVAILABLE      CHIEF COMPLAINT/REASON FOR VISIT:  Chief Complaint   Patient presents with   ? Review Of Multiple Medical Conditions       HISTORY:      HPI: Janice is a 89 y.o. female who was admitted to [facility] from 11/1/19-11/7/19 for colitis.  Janice  has a past medical history of Anemia, Arthritis, CHF (congestive heart failure) (H), Coronary artery disease, Erosive gastritis, History of non-ST elevation myocardial infarction (NSTEMI), History of transfusion, Hyperlipidemia, and Hypertension.  Janice has multiple dark stools prior to hospitalization with resulting dehydration and syncopal episode at home with fall.  She was treated with IV fluids, ciprofloxacin and Flagyl for infectious colitis with CT abdomen showing colitis extending from mid transverse colon through the rectum, large amount of stool in the rectum and borderline dilated small bowel loops with air-fluid levels.  Her diarrhea has since improved with loperamide.  She also had SANTHOSH upon hospital admission improved with IV hydration.  Janice was also started on prednisone during her inpatient stay for suspected gout with erythema on her bilateral 1st metatarsals.  She is currently at [facility] s/p acute rehabilitation.      Today Ms. Gaspar is being evaluated for a review of multiple medical conditions.  ROS is limited due to patient history of cognitive impairment.  Her potassium was improved today at 3.9 taking 10 meq potassium supplementation.  She said that her bilateral foot pain has been better over the weekend.  Nursing staff were concerned  because the patient has four open toe wounds from draining tophi.  Her BLE edema has resolved at this time.  She finished her five-day course of prednisone 40 mg daily yesterday for gout exacerbation.  The patient denied lightheadedness, dizziness, breathing difficulty, chest pain, palpitations, constipation, urinary symptoms, numbness or tingling in extremities, and pain.        Past Medical History:   Diagnosis Date   ? Anemia    ? Arthritis    ? CHF (congestive heart failure) (H)     Acute, systolic   ? Coronary artery disease    ? Erosive gastritis     History of   ? History of non-ST elevation myocardial infarction (NSTEMI)    ? History of transfusion    ? Hyperlipidemia    ? Hypertension     Essential             No family history on file.  Social History     Socioeconomic History   ? Marital status:      Spouse name: Not on file   ? Number of children: Not on file   ? Years of education: Not on file   ? Highest education level: Not on file   Occupational History   ? Not on file   Social Needs   ? Financial resource strain: Not on file   ? Food insecurity:     Worry: Not on file     Inability: Not on file   ? Transportation needs:     Medical: Not on file     Non-medical: Not on file   Tobacco Use   ? Smoking status: Never Smoker   ? Smokeless tobacco: Never Used   Substance and Sexual Activity   ? Alcohol use: Yes     Alcohol/week: 7.0 standard drinks     Types: 7 Glasses of wine per week   ? Drug use: No   ? Sexual activity: Not on file   Lifestyle   ? Physical activity:     Days per week: Not on file     Minutes per session: Not on file   ? Stress: Not on file   Relationships   ? Social connections:     Talks on phone: Not on file     Gets together: Not on file     Attends Sikhism service: Not on file     Active member of club or organization: Not on file     Attends meetings of clubs or organizations: Not on file     Relationship status: Not on file   ? Intimate partner violence:     Fear of  current or ex partner: Not on file     Emotionally abused: Not on file     Physically abused: Not on file     Forced sexual activity: Not on file   Other Topics Concern   ? Incontinent Not Asked   ? Toileting independently Not Asked   ? Cognitive impairment Not Asked   ? Vision impairment Not Asked   ? Hearing impairment Not Asked   ? Dentures Not Asked   Social History Narrative   ? Not on file       REVIEW OF SYSTEM:  Pertinent items are noted in HPI.  A 12 point comprehensive review of systems was negative except as noted.    PHYSICAL EXAM:     General Appearance:    Alert, cooperative, no distress, appears stated age; frail   Head:    Normocephalic, without obvious abnormality, atraumatic   Eyes:    PERRL, conjunctiva/corneas clear, both eyes; wears glasses   Ears:    Normal external ear canals, both ears   Nose:   Nares normal, septum midline, mucosa normal, no drainage    or sinus tenderness   Throat:   Lips, mucosa, and tongue normal; teeth and gums normal   Neck:   Supple, symmetrical, trachea midline, no adenopathy;     thyroid:  no enlargement/tenderness/nodules; no carotid    bruit or JVD   Back:     Symmetric, no curvature, ROM normal, no CVA tenderness   Lungs:     Clear to auscultation bilaterally, respirations unlabored   Chest Wall:    No tenderness or deformity    Heart:    Regular rate and rhythm, S1 and S2 normal, no murmur, rub   or gallop   Abdomen:     Soft, non-tender, bowel sounds active all four quadrants,     no masses, no organomegaly   Extremities:   Extremities normal, atraumatic, no cyanosis; BLE moderate nonpitting edema; severe erythema and tophi on bilateral great toes hot to touch; tophi opened up on left third and fourth toes and right second toe   Pulses:   2+ and symmetric all extremities   Skin:   Skin color, texture, turgor normal, no rashes or lesions   Neurologic:   Oriented to person; forgetful at times; exhibits logical thought processes; generalized weakness         LABS:      Check potassium level on 11/18/19.    ASSESSMENT:      ICD-10-CM    1. Physical deconditioning R53.81    2. Acute gout of foot, unspecified cause, unspecified laterality M10.9    3. Open wound of toe, initial encounter S91.109A    4. Hypokalemia E87.6        PLAN:      Physical deconditioning  -PT/OT as directed  -Discharge from facility per therapy recommendations  -Discharge home pending at this time    Gout  Lab Results   Component Value Date    CRP 1.6 (H) 11/15/2019   -Check uric acid level and CRP on 11/21/19  -COMPLETED prednisone 40 mg daily x 5 days  -Continue Tylenol from 1 g three times a day to four times a day  -Continue diclofenac 1% gel from two times a day to three times a day  -Continue Scheduled colchicine 0.6 mg tab 2 tabs in AM and 1 tab in PM  -Encouraged patient to utilize cold therapy three times a day and prn  -Encouraged patient to utilize integrative therapies such as distraction and deep breathing for pain management  -Notify provider if patient has new or worsening pain      Multiple open toe wounds  -Apply medihoney to open toe wounds covered with telfa wrapped with kerlix and ace wraps  -Notify provider if patient has s/s of systemic infection including fever, chills, night sweats     Hypokalemia  Lab Results   Component Value Date    K 3.9 11/18/2019   -Continue potassium chloride 10 meq daily  -Check BMP on 11/21/19    Otherwise continue current care plan for all other chronic medical conditions, as they are stable. Encouraged patient to engage in healthy lifestyle behaviors such as engaging in social activities, exercising (PT/OT), eating well, and following care plan. Follow up for routine check-up, or sooner if needed. Will continue to monitor patient and work with nursing staff collaboratively to work toward positive patient outcomes.     Electronically signed by: Mansi Fitzpatrick, CNP

## 2021-06-19 NOTE — LETTER
Letter by Mansi Fitzpatrick CNP at      Author: Mansi Fitzpatrick CNP Service: -- Author Type: --    Filed:  Encounter Date: 11/25/2019 Status: Signed         Patient: Janice Gaspar   MR Number: [id number]   YOB: 1928   Date of Visit: 11/25/2019     [facility]    Facility:   [facility] [[id number]]   Code Status: DNR/DNI and POLST AVAILABLE      CHIEF COMPLAINT/REASON FOR VISIT:  Chief Complaint   Patient presents with   ? Problem Visit     gout       HISTORY:      HPI: Janice is a 89 y.o. female who was admitted to [facility] from 11/1/19-11/7/19 for colitis.  Janice  has a past medical history of Anemia, Arthritis, CHF (congestive heart failure) (H), Coronary artery disease, Erosive gastritis, History of non-ST elevation myocardial infarction (NSTEMI), History of transfusion, Hyperlipidemia, and Hypertension.  Janice has multiple dark stools prior to hospitalization with resulting dehydration and syncopal episode at home with fall.  She was treated with IV fluids, ciprofloxacin and Flagyl for infectious colitis with CT abdomen showing colitis extending from mid transverse colon through the rectum, large amount of stool in the rectum and borderline dilated small bowel loops with air-fluid levels.  Her diarrhea has since improved with loperamide.  She also had SANTHOSH upon hospital admission improved with IV hydration.  Janice was also started on prednisone during her inpatient stay for suspected gout with erythema on her bilateral 1st metatarsals.  She is currently at [facility] s/p acute rehabilitation.      Today Ms. Gaspar is being evaluated for a review of multiple medical conditions.  ROS is limited due to patient history of cognitive impairment.  Janice reported that she feels fatigued today and refused therapy this morning.  Her CRP has returned to normal at 0.1 today from 2.2 on 11/21/19 finishing prednisone taper on Friday 11/29/19.  Janice denied  pain at this time in her bilateral feet.  Nursing staff noted that her bilateral toe wounds have been healing with medihoney daily for wound care.  The patient denied lightheadedness, dizziness, breathing difficulty, chest pain, palpitations, constipation, urinary symptoms, numbness or tingling in extremities, and pain. Nursing staff denied any new concerns for the patient at this time.      Past Medical History:   Diagnosis Date   ? Anemia    ? Arthritis    ? CHF (congestive heart failure) (H)     Acute, systolic   ? Coronary artery disease    ? Erosive gastritis     History of   ? History of non-ST elevation myocardial infarction (NSTEMI)    ? History of transfusion    ? Hyperlipidemia    ? Hypertension     Essential             No family history on file.  Social History     Socioeconomic History   ? Marital status:      Spouse name: Not on file   ? Number of children: Not on file   ? Years of education: Not on file   ? Highest education level: Not on file   Occupational History   ? Not on file   Social Needs   ? Financial resource strain: Not on file   ? Food insecurity:     Worry: Not on file     Inability: Not on file   ? Transportation needs:     Medical: Not on file     Non-medical: Not on file   Tobacco Use   ? Smoking status: Never Smoker   ? Smokeless tobacco: Never Used   Substance and Sexual Activity   ? Alcohol use: Yes     Alcohol/week: 7.0 standard drinks     Types: 7 Glasses of wine per week   ? Drug use: No   ? Sexual activity: Not on file   Lifestyle   ? Physical activity:     Days per week: Not on file     Minutes per session: Not on file   ? Stress: Not on file   Relationships   ? Social connections:     Talks on phone: Not on file     Gets together: Not on file     Attends Pentecostal service: Not on file     Active member of club or organization: Not on file     Attends meetings of clubs or organizations: Not on file     Relationship status: Not on file   ? Intimate partner violence:      Fear of current or ex partner: Not on file     Emotionally abused: Not on file     Physically abused: Not on file     Forced sexual activity: Not on file   Other Topics Concern   ? Incontinent Not Asked   ? Toileting independently Not Asked   ? Cognitive impairment Not Asked   ? Vision impairment Not Asked   ? Hearing impairment Not Asked   ? Dentures Not Asked   Social History Narrative   ? Not on file       REVIEW OF SYSTEM:  Pertinent items are noted in HPI.  A 12 point comprehensive review of systems was negative except as noted.    PHYSICAL EXAM:     General Appearance:    Alert, cooperative, no distress, appears stated age; frail   Head:    Normocephalic, without obvious abnormality, atraumatic   Eyes:    PERRL, conjunctiva/corneas clear, both eyes; wears glasses   Ears:    Normal external ear canals, both ears   Nose:   Nares normal, septum midline, mucosa normal, no drainage    or sinus tenderness   Throat:   Lips, mucosa, and tongue normal; teeth and gums normal   Neck:   Supple, symmetrical, trachea midline, no adenopathy;     thyroid:  no enlargement/tenderness/nodules; no carotid    bruit or JVD   Back:     Symmetric, no curvature, ROM normal, no CVA tenderness   Lungs:     Clear to auscultation bilaterally, respirations unlabored   Chest Wall:    No tenderness or deformity    Heart:    Regular rate and rhythm, S1 and S2 normal, no murmur, rub   or gallop   Abdomen:     Soft, non-tender, bowel sounds active all four quadrants,     no masses, no organomegaly   Extremities:   Extremities normal, atraumatic, no cyanosis; BLE moderate nonpitting edema; severe erythema and tophi on bilateral great toes; tophi opened up on left third and fourth toes and right second toe   Pulses:   2+ and symmetric all extremities   Skin:   Skin color, texture, turgor normal, no rashes or lesions   Neurologic:   Oriented to person; forgetful at times; exhibits logical thought processes; generalized weakness         LABS:      Lab Results   Component Value Date    WBC 5.7 11/15/2019    HGB 10.1 (L) 11/15/2019    HCT 33.2 (L) 11/15/2019     11/15/2019    CHOL 138 01/11/2016    TRIG 83 01/11/2016    HDL 56 01/11/2016    ALT 28 11/15/2019    AST 33 11/15/2019     11/21/2019    K 3.8 11/21/2019     (H) 11/21/2019    CREATININE 1.31 (H) 11/21/2019    BUN 23 11/21/2019    CO2 9 (LL) 11/21/2019    TSH 2.04 09/05/2017    INR 1.07 11/02/2019        ASSESSMENT:      ICD-10-CM    1. Acute gout of foot, unspecified cause, unspecified laterality M10.9    2. Physical deconditioning R53.81    3. Open wound of toe, subsequent encounter S91.109D        PLAN:      Physical deconditioning  -PT/OT as directed  -Discharge from facility per therapy recommendations  -Discharge home pending at this time    Gout  Lab Results   Component Value Date    CRP 0.1 11/25/2019   -Continue prednisone 40 mg x 2 days, 30 mg x 2 days, 20 mg x 2 days, and 10 mg x 2 days  -Continue Tylenol 500 mg Q4H PRN  -Continue Scheduled colchicine 0.6 mg tab 2 tabs in AM and 1 tab in PM  -Encouraged patient to utilize integrative therapies such as distraction and deep breathing for pain management  -Notify provider if patient has new or worsening pain      Multiple open toe wounds  -Continue medihoney to open toe wounds covered with telfa wrapped with kerlix and ace wraps  -Notify provider if patient has s/s of systemic infection including fever, chills, night sweats     Otherwise continue current care plan for all other chronic medical conditions, as they are stable. Encouraged patient to engage in healthy lifestyle behaviors such as engaging in social activities, exercising (PT/OT), eating well, and following care plan. Follow up for routine check-up, or sooner if needed. Will continue to monitor patient and work with nursing staff collaboratively to work toward positive patient outcomes.     Electronically signed by: Mansi Fitzpatrick, CNP

## 2021-06-19 NOTE — LETTER
Letter by Mansi Fitzpatrick CNP at      Author: Mansi Fitzpatrick CNP Service: -- Author Type: --    Filed:  Encounter Date: 12/2/2019 Status: Signed         Patient: Janice Gaspar   MR Number: [id number]   YOB: 1928   Date of Visit: 12/2/2019     [facility]    Facility:   [facility] [[id number]]   Code Status: DNR/DNI and POLST AVAILABLE      CHIEF COMPLAINT/REASON FOR VISIT:  Chief Complaint   Patient presents with   ? Problem Visit     diarrhea       HISTORY:      HPI: Janice is a 89 y.o. female who was admitted to [facility] from 11/1/19-11/7/19 for colitis.  Janice  has a past medical history of Anemia, Arthritis, CHF (congestive heart failure) (H), Coronary artery disease, Erosive gastritis, History of non-ST elevation myocardial infarction (NSTEMI), History of transfusion, Hyperlipidemia, and Hypertension.  Janice has multiple dark stools prior to hospitalization with resulting dehydration and syncopal episode at home with fall.  She was treated with IV fluids, ciprofloxacin and Flagyl for infectious colitis with CT abdomen showing colitis extending from mid transverse colon through the rectum, large amount of stool in the rectum and borderline dilated small bowel loops with air-fluid levels.  Her diarrhea has since improved with loperamide.  She also had SANTHOSH upon hospital admission improved with IV hydration.  Janice was also started on prednisone during her inpatient stay for suspected gout with erythema on her bilateral 1st metatarsals.  She is currently at [facility] s/p acute rehabilitation.      Today Ms. Gaspar is being evaluated for a review of multiple medical conditions.  ROS is limited due to patient history of cognitive impairment.  Nursing staff noted that the patient has not eaten and drank very little all weekend along with 4-5 daily episodes of watery diarrhea.  No providers were notified over the weekend with acute patient  changes.  Janice was also noted to have increased weakness and lethargy refusing to leave her bed for the past three days.  She had infectious colitis during her hospitalization that seemed to have resolved with ciprofloxacin and Flagyl treatment along with Culturelle probiotic.  Her creatinine was significantly elevated today on her STAT lab at 3.27 with IV NS ordered.  Her CBC and procalcitonin were both normal on STAT labs today.  Patient's daughter, Angie, was updated with her status and was agreeable to IV fluid resuscitation at this time along with Flagyl treatment for suspected infectious colitis.  The patient denied lightheadedness, dizziness, breathing difficulty, chest pain, palpitations, constipation, urinary symptoms, numbness or tingling in extremities, and pain.        Past Medical History:   Diagnosis Date   ? Anemia    ? Arthritis    ? CHF (congestive heart failure) (H)     Acute, systolic   ? Coronary artery disease    ? Erosive gastritis     History of   ? History of non-ST elevation myocardial infarction (NSTEMI)    ? History of transfusion    ? Hyperlipidemia    ? Hypertension     Essential             No family history on file.  Social History     Socioeconomic History   ? Marital status:      Spouse name: Not on file   ? Number of children: Not on file   ? Years of education: Not on file   ? Highest education level: Not on file   Occupational History   ? Not on file   Social Needs   ? Financial resource strain: Not on file   ? Food insecurity:     Worry: Not on file     Inability: Not on file   ? Transportation needs:     Medical: Not on file     Non-medical: Not on file   Tobacco Use   ? Smoking status: Never Smoker   ? Smokeless tobacco: Never Used   Substance and Sexual Activity   ? Alcohol use: Yes     Alcohol/week: 7.0 standard drinks     Types: 7 Glasses of wine per week   ? Drug use: No   ? Sexual activity: Not on file   Lifestyle   ? Physical activity:     Days per week: Not on file      Minutes per session: Not on file   ? Stress: Not on file   Relationships   ? Social connections:     Talks on phone: Not on file     Gets together: Not on file     Attends Judaism service: Not on file     Active member of club or organization: Not on file     Attends meetings of clubs or organizations: Not on file     Relationship status: Not on file   ? Intimate partner violence:     Fear of current or ex partner: Not on file     Emotionally abused: Not on file     Physically abused: Not on file     Forced sexual activity: Not on file   Other Topics Concern   ? Incontinent Not Asked   ? Toileting independently Not Asked   ? Cognitive impairment Not Asked   ? Vision impairment Not Asked   ? Hearing impairment Not Asked   ? Dentures Not Asked   Social History Narrative   ? Not on file       REVIEW OF SYSTEM:  Pertinent items are noted in HPI.  A 12 point comprehensive review of systems was negative except as noted.    PHYSICAL EXAM:     General Appearance:    lethargic, cooperative, no distress, appears stated age; frail   Head:    Normocephalic, without obvious abnormality, atraumatic   Eyes:    PERRL, conjunctiva/corneas clear, both eyes; wears glasses   Ears:    Normal external ear canals, both ears   Nose:   Nares normal, septum midline, mucosa normal, no drainage    or sinus tenderness   Throat:   Lips, mucosa, and tongue normal; teeth and gums normal   Neck:   Supple, symmetrical, trachea midline, no adenopathy;     thyroid:  no enlargement/tenderness/nodules; no carotid    bruit or JVD   Back:     Symmetric, no curvature, ROM normal, no CVA tenderness   Lungs:     Clear to auscultation bilaterally, respirations unlabored   Chest Wall:    No tenderness or deformity    Heart:    Regular rate and rhythm, S1 and S2 normal, no murmur, rub   or gallop   Abdomen:     Soft, distended; diffuse tenderness to palpation, bowel sounds hyperactive all four quadrants, no masses, no organomegaly   Extremities:    Extremities normal, atraumatic, no cyanosis; BLE moderate nonpitting edema; severe erythema and tophi on bilateral great toes; tophi opened up on left third and fourth toes and right second toe   Pulses:   2+ and symmetric all extremities   Skin:   Skin color, texture, turgor normal, no rashes or lesions   Neurologic:   Oriented to person; forgetful at times; exhibits logical thought processes; generalized weakness; lying in bed during this visit         LABS:     Check BMP, CBC, and CRP on 12/3/19.    ASSESSMENT:      ICD-10-CM    1. Physical deconditioning R53.81    2. SANTHOSH (acute kidney injury) (H) N17.9    3. Diarrhea of presumed infectious origin R19.7        PLAN:      Physical deconditioning  -PT/OT as directed  -Discharge from facility per therapy recommendations  -Discharge home anticipated for 12/6/19 with home health services unsure at this time    Diarrhea  -STAT CMP, procalcitonin, CBC, and C. Diff stool culture  -Check CBC and CRP tomorrow 12/3/19  -START Flagyl 500 mg Q8H x 10 days for presumed infectious colitis  -START Florastor 250 mg two times a day  -Continue Culturelle as prescribed  -Notify provider if patient has s/s of systemic infection including fever, chills, night sweats     SANTHOSH  -Check STAT UA/UC  -Check BMP tomorrow 12/3/19  -Creatinine 3.27 today  -STAT 1 L NS over 8 hours per PIV    Otherwise continue current care plan for all other chronic medical conditions, as they are stable. Encouraged patient to engage in healthy lifestyle behaviors such as engaging in social activities, exercising (PT/OT), eating well, and following care plan. Follow up for routine check-up, or sooner if needed. Will continue to monitor patient and work with nursing staff collaboratively to work toward positive patient outcomes.     Electronically signed by: Mansi Fitzpatrick CNP     Total floor/unit time was 60 minutes and 45 minutes was spent in counseling patient and her daughter on bowel management, SANTHOSH  management, and discharge potential in coordination of care with nursing staff and SW.

## 2021-06-19 NOTE — LETTER
Letter by Nishi Yusuf NP at      Author: Nishi Yusuf NP Service: -- Author Type: --    Filed:  Encounter Date: 2019 Status: Signed         Patient: Janice Gaspar   MR Number: [id number]   YOB: 1928   Date of Visit: 2019                 [facility]    DATE: 2019    NAME:  Janice Gaspar             :  1928  MRN: [id number]  CODE STATUS:  DNR/DNI    VISIT TYPE: Problem Visit (dehydration, diarrhea)     FACILITY:  [facility] [[id number]]       CHIEF COMPLAIN/REASON FOR VISIT:    Chief Complaint   Patient presents with   ? Problem Visit     dehydration, diarrhea               HISTORY OF PRESENT ILLNESS: Janice Gaspar is a 89 y.o. female who was admitted to [facility] - for fall, syncope, SANTHOSH. She was treated for colitis with cipro and flagyl. Diarrhea was controlled with imodium. SANTHOSH resolved with iv hydration. She was also treated for gout flare during tcu course. She was transferred to TCU for further rehab. She has PMH of NSTEMI, CAD, CHF, recurrent syncope, HTN, HLd, h/o GI bleed, large hiatal hernia.     Today Ms. Gaspar is seen in bed today. She says she is not sure when she last had diarrhea but doesn't think she had any overnight. She is not sure how her appetite is. She feels ok today just tired. She does feel better than yesterday. She does still have her iv in but is not currently connected to iv fluids. She denies any pain. She is not sure about wounds on her feet or toes. She is not feeling dizzy or lightheaded but has not really been up today. She denies feeling feverish. She is quite confused and forgetful. Per nursing staff no loose stools overnight or yet today. She completed iv fluids but does still have her iv in place. Staff are wondering if this should stay in or not. She had repeat labs yesterday and the day before. She is not complaining of much to staff. Staff are wondering about her leg wraps and  toe wounds. They are improving and she has no edema but they are supposed to be ace wrapping her legs. They are wondering why. No fevers or other concerns. Her appetite has been fair today but has been drinking fluids. Staff are offering them to her frequently. Her blood pressures have been on the lower side and she does have several blood pressure meds without parameters. Nursing are wondering which she should be receiving.     Later her daughter arrived and wanted to speak with provider. Discussed her lab results, new orders, plan for stopping iv fluids and re-evaluating again next week. Discussed her concerns for dehydration and failure to thrive as well as wound cares. She appreciated time and all questions were answered as provider able. She was agreeable to plan of care.     REVIEW OF SYSTEMS:  PROBLEMS AND REVIEW OF SYSTEMS:   Today on ROS:   Currently, no fever, chills, or rigors. Decreased vision and hearing. Denies any shortness of breath, or cough.. Denies any difficulty with swallowing, nausea, or vomiting. No active bleeding. No rash. Positive for diarrhea improving, appetite fair, weakness, confusion, forgetfulness, low blood pressures, urinary incontinence; unable to obtain further Ros due to cognition      No Known Allergies  Current Outpatient Medications   Medication Sig   ? metoprolol succinate (TOPROL-XL) 50 MG 24 hr tablet Take 50 mg by mouth daily.   ? acetaminophen (TYLENOL) 500 MG tablet Take 1,000 mg by mouth every 6 (six) hours.          ? aspirin 81 MG EC tablet TAKE 1 TABLET BY MOUTH ONCE DAILY (Patient taking differently: Take 81 mg by mouth daily.       )   ? atorvastatin (LIPITOR) 40 MG tablet TAKE 1 TABLET BY MOUTH ONCE DAILY (Patient taking differently: Take 40 mg by mouth at bedtime.       )   ? buPROPion (WELLBUTRIN SR) 100 MG 12 hr tablet Take 100 mg by mouth daily.   ? cholecalciferol, vitamin D3, 1,000 unit (25 mcg) tablet Take 1,000 Units by mouth daily.   ? colchicine 0.6 mg  tablet Take 0.6 mg by mouth 2 (two) times a day. Take 2 tabs in AM and 1 tab in PM..   ? diclofenac sodium (VOLTAREN) 1 % Gel Apply 8 g topically 2 (two) times a day. Apply to 4gm to  both hips and 4 gms to lower back         ? ferrous gluconate 324 mg (37.5 mg iron) Tab TAKE 1 TABLET BY MOUTH ONCE DAILY   ? folic acid (FOLVITE) 1 MG tablet Take 1 mg by mouth daily.   ? Lactobacillus rhamnosus GG (CULTURELLE) 15 billion cell CpSP Take 1 capsule by mouth 2 (two) times a day with meals.   ? lisinopril (PRINIVIL,ZESTRIL) 5 MG tablet Take 5 mg by mouth daily.    ? mirtazapine (REMERON) 7.5 MG tablet Take 7.5 mg by mouth at bedtime.   ? potassium chloride (K-DUR,KLOR-CON) 10 MEQ tablet Take 10 mEq by mouth daily.   ? ranitidine (ZANTAC) 300 MG tablet TAKE 1 TABLET BY MOUTH ONCE DAILY (Patient taking differently: Take 300 mg by mouth at bedtime.       )     Past Medical History:    Past Medical History:   Diagnosis Date   ? Anemia    ? Arthritis    ? CHF (congestive heart failure) (H)     Acute, systolic   ? Coronary artery disease    ? Erosive gastritis     History of   ? History of non-ST elevation myocardial infarction (NSTEMI)    ? History of transfusion    ? Hyperlipidemia    ? Hypertension     Essential           PHYSICAL EXAMINATION  Vitals:    12/04/19 2236   BP: 103/72   Pulse: 98   Resp: 20   Temp: 97.1  F (36.2  C)   SpO2: 94%   Weight: 105 lb (47.6 kg)       Today on physical exam:     GENERAL: Awake, Alert, not in any form of acute distress, answers questions appropriately, follows simple commands, conversant, cachectic, weakness  HEENT: Head is normocephalic with normal hair distribution. No evidence of trauma. Ears: No acute purulent discharge. Eyes: Conjunctivae pink with no scleral jaundice. Nose: Normal mucosa and septum. NECK: Supple with no cervical or supraclavicular lymphadenopathy. Trachea is midline. [place], decreased vision  CHEST: No tenderness or deformity, no crepitus  LUNG: dim to auscultation  with good chest expansion. There are no crackles or wheezes, normal AP diameter.  BACK: No kyphosis of the thoracic spine. Symmetric, no curvature, ROM normal, no CVA tenderness, no spinal tenderness   CVS: There is good S1  S2, regular rhythm, there are no murmurs, rubs, gallops, or heaves,  2+ pulses symmetric in all extremities.  ABDOMEN: concave and soft, nontender to palpation, non distended, no masses, no organomegaly, good bowel sounds, no rebound or guarding, no peritoneal signs.   EXTREMITIES: No pedal edema, bilateral foot wraps removed, toe wounds noted on 2nd and 3rd toes, small open areas, some starting to scab, minimal serous drainage noted on dressing, peripheral iv right forearm c/d/i  SKIN: Warm and dry, john discoloration ble  NEUROLOGICAL: The patient is oriented to person. Confused, forgetful, weakness.             LABS:   Recent Results (from the past 168 hour(s))   HM2(CBC w/o Differential)   Result Value Ref Range    WBC 7.8 4.0 - 11.0 thou/uL    RBC 4.47 3.80 - 5.40 mill/uL    Hemoglobin 14.8 12.0 - 16.0 g/dL    Hematocrit 46.7 35.0 - 47.0 %     (H) 80 - 100 fL    MCH 33.1 27.0 - 34.0 pg    MCHC 31.7 (L) 32.0 - 36.0 g/dL    RDW 13.9 11.0 - 14.5 %    Platelets 173 140 - 440 thou/uL    MPV 10.8 8.5 - 12.5 fL   Comprehensive Metabolic Panel   Result Value Ref Range    Sodium 136 136 - 145 mmol/L    Potassium 5.0 3.5 - 5.0 mmol/L    Chloride 114 (H) 98 - 107 mmol/L    CO2 9 (LL) 22 - 31 mmol/L    Anion Gap, Calculation 13 5 - 18 mmol/L    Glucose 197 (H) 70 - 125 mg/dL    BUN 78 (H) 8 - 28 mg/dL    Creatinine 3.27 (H) 0.60 - 1.10 mg/dL    GFR MDRD Af Amer 16 (L) >60 mL/min/1.73m2    GFR MDRD Non Af Amer 13 (L) >60 mL/min/1.73m2    Bilirubin, Total 0.4 0.0 - 1.0 mg/dL    Calcium 8.3 (L) 8.5 - 10.5 mg/dL    Protein, Total 5.3 (L) 6.0 - 8.0 g/dL    Albumin 2.9 (L) 3.5 - 5.0 g/dL    Alkaline Phosphatase 115 45 - 120 U/L    AST 38 0 - 40 U/L    ALT 31 0 - 45 U/L   Procalcitonin   Result Value  Ref Range    Procalcitonin 0.24 0.00 - 0.49 ng/mL   Urinalysis-UC if Indicated   Result Value Ref Range    Color, UA Yellow Colorless, Yellow, Straw, Light Yellow    Clarity, UA Clear Clear    Glucose, UA Negative Negative    Bilirubin, UA Negative Negative    Ketones, UA Negative Negative    Specific Gravity, UA 1.012 1.001 - 1.030    Blood, UA Negative Negative    pH, UA 5.0 4.5 - 8.0    Protein, UA Trace (!) Negative mg/dL    Urobilinogen, UA <2.0 E.U./dL <2.0 E.U./dL, 2.0 E.U./dL    Nitrite, UA Negative Negative    Leukocytes, UA Negative Negative    Bacteria, UA Few (!) None Seen hpf    RBC, UA 0-2 None Seen, 0-2 hpf    WBC, UA 0-5 None Seen, 0-5 hpf    Squam Epithel, UA 0-5 None Seen, 0-5 lpf    WBC Clumps None Seen None Seen    Mucus, UA Few (!) None Seen lpf    Hyaline Casts, UA 0-5 0-5, None Seen lpf   C. Diff Toxin By PCR   Result Value Ref Range    C.Difficile Toxigenic by PCR Positive (!) Negative    Ribotype 027/NAP1/B1 Presumptive Negative Presumptive Negative   Basic Metabolic Panel   Result Value Ref Range    Sodium 141 136 - 145 mmol/L    Potassium 3.8 3.5 - 5.0 mmol/L    Chloride 117 (H) 98 - 107 mmol/L    CO2 8 (LL) 22 - 31 mmol/L    Anion Gap, Calculation 16 5 - 18 mmol/L    Glucose 84 70 - 125 mg/dL    Calcium 8.6 8.5 - 10.5 mg/dL    BUN 71 (H) 8 - 28 mg/dL    Creatinine 2.80 (H) 0.60 - 1.10 mg/dL    GFR MDRD Af Amer 19 (L) >60 mL/min/1.73m2    GFR MDRD Non Af Amer 16 (L) >60 mL/min/1.73m2   C-Reactive Protein (CRP)   Result Value Ref Range    CRP 5.0 (H) 0.0 - 0.8 mg/dL   HM2(CBC w/o Differential)   Result Value Ref Range    WBC 5.8 4.0 - 11.0 thou/uL    RBC 4.00 3.80 - 5.40 mill/uL    Hemoglobin 13.1 12.0 - 16.0 g/dL    Hematocrit 41.9 35.0 - 47.0 %     (H) 80 - 100 fL    MCH 32.8 27.0 - 34.0 pg    MCHC 31.3 (L) 32.0 - 36.0 g/dL    RDW 14.0 11.0 - 14.5 %    Platelets 185 140 - 440 thou/uL    MPV 10.9 8.5 - 12.5 fL   Basic Metabolic Panel   Result Value Ref Range    Sodium 141 136 - 145  mmol/L    Potassium 3.6 3.5 - 5.0 mmol/L    Chloride 120 (H) 98 - 107 mmol/L    CO2 10 (L) 22 - 31 mmol/L    Anion Gap, Calculation 11 5 - 18 mmol/L    Glucose 68 (L) 70 - 125 mg/dL    Calcium 8.1 (L) 8.5 - 10.5 mg/dL    BUN 64 (H) 8 - 28 mg/dL    Creatinine 2.35 (H) 0.60 - 1.10 mg/dL    GFR MDRD Af Amer 23 (L) >60 mL/min/1.73m2    GFR MDRD Non Af Amer 19 (L) >60 mL/min/1.73m2   C-Reactive Protein (CRP)   Result Value Ref Range    CRP 6.5 (H) 0.0 - 0.8 mg/dL   HM2(CBC w/o Differential)   Result Value Ref Range    WBC 3.8 (L) 4.0 - 11.0 thou/uL    RBC 3.55 (L) 3.80 - 5.40 mill/uL    Hemoglobin 11.6 (L) 12.0 - 16.0 g/dL    Hematocrit 36.6 35.0 - 47.0 %     (H) 80 - 100 fL    MCH 32.7 27.0 - 34.0 pg    MCHC 31.7 (L) 32.0 - 36.0 g/dL    RDW 14.1 11.0 - 14.5 %    Platelets 163 140 - 440 thou/uL    MPV 10.5 8.5 - 12.5 fL     Results for orders placed or performed in visit on 12/04/19   Basic Metabolic Panel   Result Value Ref Range    Sodium 141 136 - 145 mmol/L    Potassium 3.6 3.5 - 5.0 mmol/L    Chloride 120 (H) 98 - 107 mmol/L    CO2 10 (L) 22 - 31 mmol/L    Anion Gap, Calculation 11 5 - 18 mmol/L    Glucose 68 (L) 70 - 125 mg/dL    Calcium 8.1 (L) 8.5 - 10.5 mg/dL    BUN 64 (H) 8 - 28 mg/dL    Creatinine 2.35 (H) 0.60 - 1.10 mg/dL    GFR MDRD Af Amer 23 (L) >60 mL/min/1.73m2    GFR MDRD Non Af Amer 19 (L) >60 mL/min/1.73m2         Lab Results   Component Value Date    WBC 3.8 (L) 12/04/2019    HGB 11.6 (L) 12/04/2019    HCT 36.6 12/04/2019     (H) 12/04/2019     12/04/2019       Lab Results   Component Value Date    [id number] 292 05/20/2019     No results found for: HGBA1C  Lab Results   Component Value Date    INR 1.07 11/02/2019    INR 1.01 01/10/2016    INR 0.96 04/24/2014     Vitamin D, Total (25-Hydroxy)   Date Value Ref Range Status   09/24/2018 37.6 30.0 - 80.0 ng/mL Final     Lab Results   Component Value Date    TSH 2.04 09/05/2017           ASSESSMENT/PLAN:    1. Infectious colitis, C  diff colitis: was put on cipro and flagyl, but now also started on vanco for positive c diff. Will dc cipro and flagyl at this time. On ranitidine and pantoprazole, recently started on pantoprazole. PPI treatment contraindicated in c diff as can contribute to infection. No complaints of nausea, indigestion. Will dc PPI. Continue ranitidine for now. Improved loose stools today. Imodium was stopped. On probiotic two times a day.   2. SANTHOSH: received 3L of IV fluids in past few days. Peripheral iv still in right forearm. Cr improved yesterdays labs from 1.82-3.27-2.8-2.35. not far from baseline at this time. Will have staff offer liquids [id number]. Encourage intake. Hold lasix, which will also help and recheck on 12/9. Dc peripheral iv.   3. CAD: No chest pain or concerns. Continue current regimen.   4. CHF: Daily [id number]. No shortness of breath or edema noted today. Currently on lasix po but also being treated for SANTHOSH, hypovolemia, hypotension. Will hold lasix at this time. May restart on 12/9, labs ordered for 12/9 to determine if will restart or not.   5. HTN: Now hypotensive 90-100s. Asymptomatic but has not been up much. Mostly likely s/t hypovolemia and acute illness. On multiple hypertensive, no hold parameters. Will dc amlodipine, reduced lisinopril dose, decrease metoprolol, added hold parameters to all meds.   6. Depression: controlled on wellbutrin. On remeron as well.   7. Hypokalemia: on supplement.   8. Chronic pain: on voltaren gel, tylenol. No complaints today.   9. Bilateral toe wounds: examined today, very small wounds on 2nd and 3rd toe bilateral feet. Minimal drainage. Appears nearly scabbed, will change wound cares to medihoney, non adherent gauze, wrap with kerlix on toes only. Dc ace wraps to feet and legs. No edema at this time.   10. Mild cognitive impairment: a/o x 1 today. Confused but calm. No behavior concerns.     Electronically signed by: Nishi Yusuf NP    Total floor/unit time  spent 35 min with 25 min spent on counseling and coordination of care. Counseling regarding lab results, diagnoses, prognosis, treatment recommendations, SANTHOSH and hypovolemia management, further lab monitoring, treatment for c diff, treatment for chf, wound cares, specialty care follow ups. Coordinated care with nursing for management of lab monitoring, chf management, santhosh and hypotension management, med changes.

## 2021-06-19 NOTE — LETTER
Letter by Mansi Fitzpatrick CNP at      Author: Mansi Fitzpatrick CNP Service: -- Author Type: --    Filed:  Encounter Date: 12/3/2019 Status: Signed         Patient: Janice Gapsar   MR Number: [id number]   YOB: 1928   Date of Visit: 12/3/2019     [facility]    Facility:   [facility] [[id number]]   Code Status: DNR/DNI and POLST AVAILABLE      CHIEF COMPLAINT/REASON FOR VISIT:  Chief Complaint   Patient presents with   ? Problem Visit     c. difficile colitis       HISTORY:      HPI: Janice is a 89 y.o. female who was admitted to [facility] from 11/1/19-11/7/19 for colitis.  Janice  has a past medical history of Anemia, Arthritis, CHF (congestive heart failure) (H), Coronary artery disease, Erosive gastritis, History of non-ST elevation myocardial infarction (NSTEMI), History of transfusion, Hyperlipidemia, and Hypertension.  Janice has multiple dark stools prior to hospitalization with resulting dehydration and syncopal episode at home with fall.  She was treated with IV fluids, ciprofloxacin and Flagyl for infectious colitis with CT abdomen showing colitis extending from mid transverse colon through the rectum, large amount of stool in the rectum and borderline dilated small bowel loops with air-fluid levels.  Her diarrhea has since improved with loperamide.  She also had SANTHOSH upon hospital admission improved with IV hydration.  Janice was also started on prednisone during her inpatient stay for suspected gout with erythema on her bilateral 1st metatarsals.  She is currently at [facility] s/p acute rehabilitation.      Today Ms. Gaspar is being evaluated for a review of multiple medical conditions.  ROS is limited due to patient history of cognitive impairment.  Janice had a positive C. Difficile stool culture that resulted today.  She has been taking Flagyl since yesterday for infectious colitis.  Her diarrhea has slowed down with 2 stools over the  past 24 hours.  Her creatinine was improved to 2.8 today after receiving 1 L NS yesterday evening.  She continues to have poor oral intake today.  Updated her daughter, Angie, who is her POA with lab results and she would like the patient to continue to be treated at the TCU facility at this time and is okay with further IV fluids for dehydration at this time.  The patient denied lightheadedness, dizziness, breathing difficulty, chest pain, palpitations, constipation, urinary symptoms, numbness or tingling in extremities, and pain.       Past Medical History:   Diagnosis Date   ? Anemia    ? Arthritis    ? CHF (congestive heart failure) (H)     Acute, systolic   ? Coronary artery disease    ? Erosive gastritis     History of   ? History of non-ST elevation myocardial infarction (NSTEMI)    ? History of transfusion    ? Hyperlipidemia    ? Hypertension     Essential             No family history on file.  Social History     Socioeconomic History   ? Marital status:      Spouse name: Not on file   ? Number of children: Not on file   ? Years of education: Not on file   ? Highest education level: Not on file   Occupational History   ? Not on file   Social Needs   ? Financial resource strain: Not on file   ? Food insecurity:     Worry: Not on file     Inability: Not on file   ? Transportation needs:     Medical: Not on file     Non-medical: Not on file   Tobacco Use   ? Smoking status: Never Smoker   ? Smokeless tobacco: Never Used   Substance and Sexual Activity   ? Alcohol use: Yes     Alcohol/week: 7.0 standard drinks     Types: 7 Glasses of wine per week   ? Drug use: No   ? Sexual activity: Not on file   Lifestyle   ? Physical activity:     Days per week: Not on file     Minutes per session: Not on file   ? Stress: Not on file   Relationships   ? Social connections:     Talks on phone: Not on file     Gets together: Not on file     Attends [employer] service: Not on file     Active member of club or  organization: Not on file     Attends meetings of clubs or organizations: Not on file     Relationship status: Not on file   ? Intimate partner violence:     Fear of current or ex partner: Not on file     Emotionally abused: Not on file     Physically abused: Not on file     Forced sexual activity: Not on file   Other Topics Concern   ? Incontinent Not Asked   ? Toileting independently Not Asked   ? Cognitive impairment Not Asked   ? Vision impairment Not Asked   ? Hearing impairment Not Asked   ? Dentures Not Asked   Social History Narrative   ? Not on file       REVIEW OF SYSTEM:  Pertinent items are noted in HPI.  A 12 point comprehensive review of systems was negative except as noted.    PHYSICAL EXAM:     General Appearance:    lethargic, cooperative, no distress, appears stated age; frail   Head:    Normocephalic, without obvious abnormality, atraumatic   Eyes:    PERRL, conjunctiva/corneas clear, both eyes; wears glasses   Ears:    Normal external ear canals, both ears   Nose:   Nares normal, septum midline, mucosa normal, no drainage    or sinus tenderness   Throat:   Lips, mucosa, and tongue normal; teeth and gums normal   Neck:   Supple, symmetrical, trachea midline, no adenopathy;     thyroid:  no enlargement/tenderness/nodules; no carotid    bruit or JVD   Back:     Symmetric, no curvature, ROM normal, no CVA tenderness   Lungs:     Clear to auscultation bilaterally, respirations unlabored   Chest Wall:    No tenderness or deformity    Heart:    Regular rate and rhythm, S1 and S2 normal, no murmur, rub   or gallop   Abdomen:     Soft, distended; diffuse tenderness to palpation, bowel sounds hyperactive all four quadrants, no masses, no organomegaly   Extremities:   Extremities normal, atraumatic, no cyanosis; BLE moderate nonpitting edema; severe erythema and tophi on bilateral great toes; tophi opened up on left third and fourth toes and right second toe   Pulses:   2+ and symmetric all extremities    Skin:   Skin color, texture, turgor normal, no rashes or lesions   Neurologic:   Oriented to person; forgetful at times; exhibits logical thought processes; generalized weakness; lying in bed during this visit         LABS:     Check BMP and CRP on 12/4/19.    ASSESSMENT:      ICD-10-CM    1. Physical deconditioning R53.81    2. SANTHOSH (acute kidney injury) (H) N17.9    3. C. difficile colitis A04.72        PLAN:      Physical deconditioning  -PT/OT as directed  -Discharge from facility per therapy recommendations  -Discharge home anticipated for 12/6/19 with home health services unsure at this time    C. Difficile colitis  -START vancomycin 125 mg every six hours x 14 days  -Continue Flagyl 500 mg Q8H x 10 days for presumed infectious colitis  -Continue Florastor 250 mg two times a day  -Continue Culturelle as prescribed  -Notify provider if patient has s/s of systemic infection including fever, chills, night sweats     SANTHOSH  -Check BMP tomorrow 12/4/19  -Creatinine 2.8 today  -STAT 1 L NS over 8 hours per PIV    Otherwise continue current care plan for all other chronic medical conditions, as they are stable. Encouraged patient to engage in healthy lifestyle behaviors such as engaging in social activities, exercising (PT/OT), eating well, and following care plan. Follow up for routine check-up, or sooner if needed. Will continue to monitor patient and work with nursing staff collaboratively to work toward positive patient outcomes.     Electronically signed by: Mansi Fitzpatrick CNP     Total floor/unit time was 60 minutes and 45 minutes was spent in counseling patient and her daughter on bowel management, SANTHOSH management, and discharge potential in coordination of care with nursing staff and SW.

## 2021-06-19 NOTE — LETTER
Letter by Mansi Fitzpatrick CNP at      Author: Mansi Fitzpatrick CNP Service: -- Author Type: --    Filed:  Encounter Date: 11/11/2019 Status: Signed         Patient: Janice Gaspar   MR Number: [id number]   YOB: 1928   Date of Visit: 11/11/2019     [facility]    Facility:   [facility] [[id number]]   Code Status: DNR/DNI and POLST AVAILABLE      CHIEF COMPLAINT/REASON FOR VISIT:  Chief Complaint   Patient presents with   ? Review Of Multiple Medical Conditions       HISTORY:      HPI: Janice is a 89 y.o. female who was admitted to [facility] from 11/1/19-11/7/19 for colitis.  Janice  has a past medical history of Anemia, Arthritis, CHF (congestive heart failure) (H), Coronary artery disease, Erosive gastritis, History of non-ST elevation myocardial infarction (NSTEMI), History of transfusion, Hyperlipidemia, and Hypertension.  Janice has multiple dark stools prior to hospitalization with resulting dehydration and syncopal episode at home with fall.  She was treated with IV fluids, ciprofloxacin and Flagyl for infectious colitis with CT abdomen showing colitis extending from mid transverse colon through the rectum, large amount of stool in the rectum and borderline dilated small bowel loops with air-fluid levels.  Her diarrhea has since improved with loperamide.  She also had SANTHOSH upon hospital admission improved with IV hydration.  Janice was also started on prednisone during her inpatient stay for suspected gout with erythema on her bilateral 1st metatarsals.  She is currently at [facility] s/p acute rehabilitation.      Today Ms. Gaspar is being evaluated for a review of multiple medical conditions.  Her main concern is continued bilateral foot pain with worsening erythema and edema rated at 6-7/10 taking Voltaren gel, prednisone, and Tylenol for pain management at this time.  We discussed doing labs tomorrow for further evaluation with her and her  son and they were agreeable to this plan along with starting colchicine for suspected acute gout.  Her hemoglobin was low today at 8.5 decreased from 9.8 on 11/5/19.  Janiec has been tolerating therapy exercises well with her bilateral foot pain.  The patient denied lightheadedness, dizziness, breathing difficulty, chest pain, palpitations, constipation, urinary symptoms, and numbness or tingling in extremities.  Nursing staff denied any other concerns for the patient at this time.      Past Medical History:   Diagnosis Date   ? Anemia    ? Arthritis    ? CHF (congestive heart failure) (H)     Acute, systolic   ? Coronary artery disease    ? Erosive gastritis     History of   ? History of non-ST elevation myocardial infarction (NSTEMI)    ? History of transfusion    ? Hyperlipidemia    ? Hypertension     Essential             No family history on file.  Social History     Socioeconomic History   ? Marital status:      Spouse name: Not on file   ? Number of children: Not on file   ? Years of education: Not on file   ? Highest education level: Not on file   Occupational History   ? Not on file   Social Needs   ? Financial resource strain: Not on file   ? Food insecurity:     Worry: Not on file     Inability: Not on file   ? Transportation needs:     Medical: Not on file     Non-medical: Not on file   Tobacco Use   ? Smoking status: Never Smoker   ? Smokeless tobacco: Never Used   Substance and Sexual Activity   ? Alcohol use: Yes     Alcohol/week: 7.0 standard drinks     Types: 7 Glasses of wine per week   ? Drug use: No   ? Sexual activity: Not on file   Lifestyle   ? Physical activity:     Days per week: Not on file     Minutes per session: Not on file   ? Stress: Not on file   Relationships   ? Social connections:     Talks on phone: Not on file     Gets together: Not on file     Attends Holiness service: Not on file     Active member of club or organization: Not on file     Attends meetings of clubs or  organizations: Not on file     Relationship status: Not on file   ? Intimate partner violence:     Fear of current or ex partner: Not on file     Emotionally abused: Not on file     Physically abused: Not on file     Forced sexual activity: Not on file   Other Topics Concern   ? Not on file   Social History Narrative   ? Not on file       REVIEW OF SYSTEM:  Pertinent items are noted in HPI.  A 12 point comprehensive review of systems was negative except as noted.    PHYSICAL EXAM:     General Appearance:    Alert, cooperative, no distress, appears stated age; frail   Head:    Normocephalic, without obvious abnormality, atraumatic   Eyes:    PERRL, conjunctiva/corneas clear, both eyes   Ears:    Normal external ear canals, both ears   Nose:   Nares normal, septum midline, mucosa normal, no drainage    or sinus tenderness   Throat:   Lips, mucosa, and tongue normal; teeth and gums normal   Neck:   Supple, symmetrical, trachea midline, no adenopathy;     thyroid:  no enlargement/tenderness/nodules; no carotid    bruit or JVD   Back:     Symmetric, no curvature, ROM normal, no CVA tenderness   Lungs:     Clear to auscultation bilaterally, respirations unlabored   Chest Wall:    No tenderness or deformity    Heart:    Regular rate and rhythm, S1 and S2 normal, no murmur, rub   or gallop   Abdomen:     Soft, non-tender, bowel sounds active all four quadrants,     no masses, no organomegaly   Extremities:   Extremities normal, atraumatic, no cyanosis; BLE moderate nonpitting edema; severe erythema and tophi on bilateral great toes hot to touch   Pulses:   2+ and symmetric all extremities   Skin:   Skin color, texture, turgor normal, no rashes or lesions   Neurologic:   Oriented to person; forgetful at times; exhibits logical thought processes; generalized weakness         LABS:     Lab Results   Component Value Date    WBC 9.7 11/11/2019    HGB 8.5 (L) 11/11/2019    HCT 27.0 (L) 11/11/2019     11/11/2019    CHOL 138  01/11/2016    TRIG 83 01/11/2016    HDL 56 01/11/2016    ALT 16 11/02/2019    AST 29 11/02/2019     11/11/2019    K 3.8 11/11/2019     (H) 11/11/2019    CREATININE 0.91 11/11/2019    BUN 26 11/11/2019    CO2 21 (L) 11/11/2019    TSH 2.04 09/05/2017    INR 1.07 11/02/2019        ASSESSMENT:      ICD-10-CM    1. Physical deconditioning R53.81    2. Anemia, unspecified type D64.9    3. Acute gout of foot, unspecified cause, unspecified laterality M10.9    4. Pain in both feet M79.671     M79.672        PLAN:      Physical deconditioning  -PT/OT as directed  -Discharge from facility per therapy recommendations  -Discharge home pending at this time    Gout/Foot pain  -Continue prednisone 30 mg daily  -Continue Tylenol from 1 g three times a day to four times a day  -Continue diclofenac 1% gel from two times a day to three times a day  -START colchicine 0.6 mg tab 2 tabs in AM and 1 tab daily PRN  -Check uric acid level, ESR, and CRP on 11/12/19  -Encouraged patient to utilize cold therapy three times a day and prn  -Encouraged patient to utilize integrative therapies such as distraction and deep breathing for pain management  -Notify provider if patient has new or worsening pain      Anemia  Lab Results   Component Value Date    HGB 8.5 (L) 11/11/2019   -Recheck hemoglobin on 11/15/19  -Continue ferrous gluconate 324 mg daily    Otherwise continue current care plan for all other chronic medical conditions, as they are stable. Encouraged patient to engage in healthy lifestyle behaviors such as engaging in social activities, exercising (PT/OT), eating well, and following care plan. Follow up for routine check-up, or sooner if needed. Will continue to monitor patient and work with nursing staff collaboratively to work toward positive patient outcomes.     Electronically signed by: Mansi Fitzpatrick CNP     Total floor/unit time was 60 minutes and 40 minutes was spent in counseling patient on gout management,  pain management, and acute rehabilitation and coordination of care with nursing staff and SW.

## 2021-06-20 NOTE — LETTER
Letter by Mansi Fitzpatrick CNP at      Author: Mansi Fitzpatrick CNP Service: -- Author Type: --    Filed:  Encounter Date: 11/29/2019 Status: Signed         Patient: Janice Gaspar   MR Number: [id number]   YOB: 1928   Date of Visit: 11/29/2019     [facility]    Facility:   [facility] [[id number]]   Code Status: DNR/DNI and POLST AVAILABLE      CHIEF COMPLAINT/REASON FOR VISIT:  Chief Complaint   Patient presents with   ? Problem Visit       HISTORY:      HPI: Janice is a 89 y.o. female who was admitted to [facility] from 11/1/19-11/7/19 for colitis.  Janice  has a past medical history of Anemia, Arthritis, CHF (congestive heart failure) (H), Coronary artery disease, Erosive gastritis, History of non-ST elevation myocardial infarction (NSTEMI), History of transfusion, Hyperlipidemia, and Hypertension.  Janice has multiple dark stools prior to hospitalization with resulting dehydration and syncopal episode at home with fall.  She was treated with IV fluids, ciprofloxacin and Flagyl for infectious colitis with CT abdomen showing colitis extending from mid transverse colon through the rectum, large amount of stool in the rectum and borderline dilated small bowel loops with air-fluid levels.  Her diarrhea has since improved with loperamide.  She also had SANTHOSH upon hospital admission improved with IV hydration.  Janice was also started on prednisone during her inpatient stay for suspected gout with erythema on her bilateral 1st metatarsals.  She is currently at [facility] s/p acute rehabilitation.      Today Ms. Gaspar is being evaluated for a review of multiple medical conditions.  ROS is limited due to patient history of cognitive impairment.  Janice's daughters are concerned that their mother has had a decreased appetite since her admission [facility].  RD has been working with patient to increase caloric intake at meals and provided nutritional supplement.   She has had a 7 lbs weight loss over the past two weeks with decreased oral intake.  She denied any abdominal pain, gastric upset, or reflux at this time.  Nursing staff have been encouraging her to increase her fluid intake with acute kidney injury noted on her BMP today with creatinine 1.82 increased from 1.3 earlier this week.  She has been intermittently participating in therapy and has not been able to ambulate or perform ADLs without moderate to total assistance at this time.  The patient denied lightheadedness, dizziness, breathing difficulty, chest pain, palpitations, constipation, urinary symptoms, numbness or tingling in extremities, and pain.        Past Medical History:   Diagnosis Date   ? Anemia    ? Arthritis    ? CHF (congestive heart failure) (H)     Acute, systolic   ? Coronary artery disease    ? Erosive gastritis     History of   ? History of non-ST elevation myocardial infarction (NSTEMI)    ? History of transfusion    ? Hyperlipidemia    ? Hypertension     Essential             No family history on file.  Social History     Socioeconomic History   ? Marital status:      Spouse name: Not on file   ? Number of children: Not on file   ? Years of education: Not on file   ? Highest education level: Not on file   Occupational History   ? Not on file   Social Needs   ? Financial resource strain: Not on file   ? Food insecurity:     Worry: Not on file     Inability: Not on file   ? Transportation needs:     Medical: Not on file     Non-medical: Not on file   Tobacco Use   ? Smoking status: Never Smoker   ? Smokeless tobacco: Never Used   Substance and Sexual Activity   ? Alcohol use: Yes     Alcohol/week: 7.0 standard drinks     Types: 7 Glasses of wine per week   ? Drug use: No   ? Sexual activity: Not on file   Lifestyle   ? Physical activity:     Days per week: Not on file     Minutes per session: Not on file   ? Stress: Not on file   Relationships   ? Social connections:     Talks on phone:  Not on file     Gets together: Not on file     Attends Jain service: Not on file     Active member of club or organization: Not on file     Attends meetings of clubs or organizations: Not on file     Relationship status: Not on file   ? Intimate partner violence:     Fear of current or ex partner: Not on file     Emotionally abused: Not on file     Physically abused: Not on file     Forced sexual activity: Not on file   Other Topics Concern   ? Incontinent Not Asked   ? Toileting independently Not Asked   ? Cognitive impairment Not Asked   ? Vision impairment Not Asked   ? Hearing impairment Not Asked   ? Dentures Not Asked   Social History Narrative   ? Not on file       REVIEW OF SYSTEM:  Pertinent items are noted in HPI.  A 12 point comprehensive review of systems was negative except as noted.    PHYSICAL EXAM:     General Appearance:    Alert, cooperative, no distress, appears stated age; frail   Head:    Normocephalic, without obvious abnormality, atraumatic   Eyes:    PERRL, conjunctiva/corneas clear, both eyes; wears glasses   Ears:    Normal external ear canals, both ears   Nose:   Nares normal, septum midline, mucosa normal, no drainage    or sinus tenderness   Throat:   Lips, mucosa, and tongue normal; teeth and gums normal   Neck:   Supple, symmetrical, trachea midline, no adenopathy;     thyroid:  no enlargement/tenderness/nodules; no carotid    bruit or JVD   Back:     Symmetric, no curvature, ROM normal, no CVA tenderness   Lungs:     Clear to auscultation bilaterally, respirations unlabored   Chest Wall:    No tenderness or deformity    Heart:    Regular rate and rhythm, S1 and S2 normal, no murmur, rub   or gallop   Abdomen:     Soft, non-tender, bowel sounds active all four quadrants,     no masses, no organomegaly   Extremities:   Extremities normal, atraumatic, no cyanosis; BLE moderate nonpitting edema; severe erythema and tophi on bilateral great toes; tophi opened up on left third and  fourth toes and right second toe   Pulses:   2+ and symmetric all extremities   Skin:   Skin color, texture, turgor normal, no rashes or lesions   Neurologic:   Oriented to person; forgetful at times; exhibits logical thought processes; generalized weakness         LABS:     None ordered at this visit.    ASSESSMENT:      ICD-10-CM    1. SANTHOSH (acute kidney injury) (H) N17.9    2. Physical deconditioning R53.81    3. Decreased appetite R63.0        PLAN:      Physical deconditioning  -PT/OT as directed  -Discharge from facility per therapy recommendations  -Discharge home anticipated for 12/6/19 with home health services    SANTHOSH  -Creatinine 1.82 today  -Recheck next week  -Encourage oral fluid intake    Decreased appetite  -Continue to work with RD for increased caloric intake  -Continue to encourage patient to eat at meals    Otherwise continue current care plan for all other chronic medical conditions, as they are stable. Encouraged patient to engage in healthy lifestyle behaviors such as engaging in social activities, exercising (PT/OT), eating well, and following care plan. Follow up for routine check-up, or sooner if needed. Will continue to monitor patient and work with nursing staff collaboratively to work toward positive patient outcomes.     Electronically signed by: Mansi Fitzpatrick, CNP

## 2021-06-20 NOTE — LETTER
Letter by Nishi Yusuf NP at      Author: Nishi Yusfu NP Service: -- Author Type: --    Filed:  Encounter Date: 12/10/2019 Status: Signed         Patient: Janice Gaspar   MR Number: [id number]   YOB: 1928   Date of Visit: 12/10/2019                 [facility]    DATE: 12/10/2019    NAME:  Janice Gaspar             :  1928  MRN: [id number]  CODE STATUS:  DNR/DNI    VISIT TYPE: Discharge Summary     FACILITY:  [facility] [[id number]]       CHIEF COMPLAIN/REASON FOR VISIT:    Chief Complaint   Patient presents with   ? Discharge Summary               HISTORY OF PRESENT ILLNESS: Janice Gaspar is a 89 y.o. female who was admitted to [facility] - for fall, syncope, SANTHOSH. She was treated for colitis with cipro and flagyl. Diarrhea was controlled with imodium. SANTHOSH resolved with iv hydration. She was also treated for gout flare during tcu course. She was transferred to TCU for further rehab. She has PMH of NSTEMI, CAD, CHF, recurrent syncope, HTN, HLd, h/o GI bleed, large hiatal hernia.     TCU course:   Ms. Gaspar has made some progress with therapy. She was having severe diarrhea and was found to be positive for c diff. She did receive 3 L of IV fluids during her tcu course. She had previously been treated for infectious colitis. She was started on vancomycin and cipro and flagyl were stopped. She was started on lactobacillus and florastor was stopped. She was taken off lasix due to weight loss, malnutrition, SANTHOSH. Her potassium supplement was increased. She was treated for bilateral toe wounds, which appear to be healing. Her renal function has significantly improved from carla of 3.27 to 1.09 on . Family had requested hospice consult and this was ordered. However, staff report today that she will be moving to [facility] living with home care services HH Pt, OT, HHA, SW, RN. She will need to f/u with PCP in 5-7 days. Family had  requested orders for a hospital bed but she did not have qualifying diagnosis or meet criteria for this. Family was given info on renting versus purchasing.     REVIEW OF SYSTEMS:  PROBLEMS AND REVIEW OF SYSTEMS:   Today on ROS:   Currently, no fever, chills, or rigors. Decreased vision and hearing. Denies any shortness of breath, or cough.. Denies any difficulty with swallowing, nausea, or vomiting. No active bleeding. No rash. Positive for diarrhea improving, appetite fair, weakness, confusion, forgetfulness, urinary incontinence; bilateral toe wounds, unable to obtain further Ros due to cognition      No Known Allergies  Current Outpatient Medications   Medication Sig   ? acetaminophen (TYLENOL) 500 MG tablet Take 1,000 mg by mouth every 6 (six) hours.          ? aspirin 81 MG EC tablet TAKE 1 TABLET BY MOUTH ONCE DAILY (Patient taking differently: Take 81 mg by mouth daily.       )   ? atorvastatin (LIPITOR) 40 MG tablet TAKE 1 TABLET BY MOUTH ONCE DAILY (Patient taking differently: Take 40 mg by mouth at bedtime.       )   ? buPROPion (WELLBUTRIN SR) 100 MG 12 hr tablet Take 100 mg by mouth daily.   ? cholecalciferol, vitamin D3, 1,000 unit (25 mcg) tablet Take 1,000 Units by mouth daily.   ? colchicine 0.6 mg tablet Take 0.6 mg by mouth 2 (two) times a day. Take 2 tabs in AM and 1 tab in PM..   ? diclofenac sodium (VOLTAREN) 1 % Gel Apply 8 g topically 2 (two) times a day. Apply to 4gm to  both hips and 4 gms to lower back         ? ferrous gluconate 324 mg (37.5 mg iron) Tab TAKE 1 TABLET BY MOUTH ONCE DAILY   ? folic acid (FOLVITE) 1 MG tablet Take 1 mg by mouth daily.   ? Lactobacillus rhamnosus GG (CULTURELLE) 15 billion cell CpSP Take 1 capsule by mouth 2 (two) times a day with meals.   ? lisinopril (PRINIVIL,ZESTRIL) 5 MG tablet Take 5 mg by mouth daily.    ? metoprolol succinate (TOPROL-XL) 50 MG 24 hr tablet Take 50 mg by mouth daily.   ? mirtazapine (REMERON) 7.5 MG tablet Take 7.5 mg by mouth at  bedtime.   ? potassium chloride (K-DUR,KLOR-CON) 10 MEQ tablet Take 20 mEq by mouth daily.    ? ranitidine (ZANTAC) 300 MG tablet TAKE 1 TABLET BY MOUTH ONCE DAILY (Patient taking differently: Take 300 mg by mouth at bedtime.       )     Past Medical History:    Past Medical History:   Diagnosis Date   ? Anemia    ? Arthritis    ? CHF (congestive heart failure) (H)     Acute, systolic   ? Coronary artery disease    ? Erosive gastritis     History of   ? History of non-ST elevation myocardial infarction (NSTEMI)    ? History of transfusion    ? Hyperlipidemia    ? Hypertension     Essential           PHYSICAL EXAMINATION  Vitals:    12/10/19 0700   BP: 119/80   Pulse: 72   Resp: 18   Temp: 98.4  F (36.9  C)   SpO2: 96%   Weight: 111 lb (50.3 kg)       Today on physical exam:     GENERAL: Awake, Alert, not in any form of acute distress, answers questions appropriately, follows simple commands, conversant, cachectic, weakness  HEENT: Head is normocephalic with normal hair distribution. No evidence of trauma. Ears: No acute purulent discharge. Eyes: Conjunctivae pink with no scleral jaundice. Nose: Normal mucosa and septum. NECK: Supple with no cervical or supraclavicular lymphadenopathy. Trachea is midline. [place], decreased vision  CHEST: No tenderness or deformity, no crepitus  LUNG: dim to auscultation with good chest expansion. There are no crackles or wheezes, normal AP diameter.  BACK: No kyphosis of the thoracic spine. Symmetric, no curvature, ROM normal, no CVA tenderness, no spinal tenderness   CVS: There is good S1  S2, regular rhythm, there are no murmurs, rubs, gallops, or heaves,  2+ pulses symmetric in all extremities.  ABDOMEN: concave and soft, nontender to palpation, non distended, no masses, no organomegaly, good bowel sounds, no rebound or guarding, no peritoneal signs.   EXTREMITIES: No pedal edema, bilateral toe wounds covered today  SKIN: Warm and dry, john discoloration ble  NEUROLOGICAL: The  patient is oriented to person. Confused, forgetful.            LABS:   Recent Results (from the past 168 hour(s))   Basic Metabolic Panel   Result Value Ref Range    Sodium 136 136 - 145 mmol/L    Potassium 3.3 (L) 3.5 - 5.0 mmol/L    Chloride 117 (H) 98 - 107 mmol/L    CO2 12 (L) 22 - 31 mmol/L    Anion Gap, Calculation 7 5 - 18 mmol/L    Glucose 78 70 - 125 mg/dL    Calcium 7.7 (L) 8.5 - 10.5 mg/dL    BUN 24 8 - 28 mg/dL    Creatinine 1.09 0.60 - 1.10 mg/dL    GFR MDRD Af Amer 57 (L) >60 mL/min/1.73m2    GFR MDRD Non Af Amer 47 (L) >60 mL/min/1.73m2     Results for orders placed or performed in visit on 12/09/19   Basic Metabolic Panel   Result Value Ref Range    Sodium 136 136 - 145 mmol/L    Potassium 3.3 (L) 3.5 - 5.0 mmol/L    Chloride 117 (H) 98 - 107 mmol/L    CO2 12 (L) 22 - 31 mmol/L    Anion Gap, Calculation 7 5 - 18 mmol/L    Glucose 78 70 - 125 mg/dL    Calcium 7.7 (L) 8.5 - 10.5 mg/dL    BUN 24 8 - 28 mg/dL    Creatinine 1.09 0.60 - 1.10 mg/dL    GFR MDRD Af Amer 57 (L) >60 mL/min/1.73m2    GFR MDRD Non Af Amer 47 (L) >60 mL/min/1.73m2         Lab Results   Component Value Date    WBC 3.8 (L) 12/04/2019    HGB 11.6 (L) 12/04/2019    HCT 36.6 12/04/2019     (H) 12/04/2019     12/04/2019       Lab Results   Component Value Date    CTNHNURP79 292 05/20/2019     No results found for: HGBA1C  Lab Results   Component Value Date    INR 1.07 11/02/2019    INR 1.01 01/10/2016    INR 0.96 04/24/2014     Vitamin D, Total (25-Hydroxy)   Date Value Ref Range Status   09/24/2018 37.6 30.0 - 80.0 ng/mL Final     Lab Results   Component Value Date    TSH 2.04 09/05/2017           ASSESSMENT/PLAN:    1. Infectious colitis, C diff colitis: previously stopped cipro and flagyl. On vanco currently.  On ranitidine. Previously stopped pantoprazole as PPI treatment contraindicated in c diff as can contribute to infection. No complaints of nausea, indigestion. Appetite improving. On probiotic. Diarrhea appears  improving today.    2. SANTHOSH: received 3L of IV fluids previously. Removed IV last week. Cr improving 1.82-3.27-2.8-2.35-1.09 on 12/9. staff offering liquids [id number]. Encourage intake. Stopped lasix previously and reduced lisinopril. Avoid nephrotoxins, appears prerenal with hypovolemia and hypotension. Previously stopped amlodipine and reduced antihypertensives as well. Responded very well to med changes over weekend and creatinine actually improved from baseline.   3. CAD: No chest pain or concerns. Continue current regimen.   4. CHF: Daily [id number]. No shortness of breath or edema noted today. Held lasix over weekend and weights did improve as did intake. No signs of further fluid overload. Continues to have no shortness of breath, hypoxia, edema. Continue off lasix for this time. Monitor.   5. HTN: previously hypotensive and changed meds, stopped amlodipine. Now much improved to 110s. Asymptomatic but has not been up much. Mostly likely s/t hypovolemia and acute illness. previously stopped amlodipine, reduced lisinopril dose, decreased metoprolol, added hold parameters to all meds. Now much improved.   6. Depression: controlled on wellbutrin. On remeron as well.   7. Hypokalemia: on supplement.   8. Chronic pain: on voltaren gel, tylenol. No complaints today.   9. Bilateral toe wounds: examined last week. small wounds on 2nd and 3rd toe bilateral feet. Much improving. wound cares medihoney, non adherent gauze, wrap with kerlix on toes only. No edema.    10. Mild cognitive impairment: a/o x 1 today. Confused but calm. No behavior concerns.     Electronically signed by: Nishi Yusuf NP    Total floor/unit time spent 35 min with 25 min spent on counseling and coordination of care. Counseling regarding discharge instructions, med orders for discharge, primary care follow up, specialty care follow ups, tcu course and med changes, lab results. Coordinated care with nursing, therapy, social  "services for discharge planning, equipment needs, services for discharge, primary care follow up, med orders for discharge, specialty care follow ups.     Please evaluate Janice Gaspar for admission to Home Health.    Face to Face Attestation and Initial Plan of Care    The face-to-face encounter occurred on date: 12/10/19  Face to Face encounter was with: Nishi Yusuf    Please provide brief clinical summary of reason for visit and need for home care. Deconditioning after hospital stay for colitis    Please identify which of the following home health disciplines the patient will need AND describe the skilled services that you would like the home health agency to perform: SKILLED NURSING (RN): complex med management and wound care, PHYSICAL THERAPY: strength training and gait training, OCCUPATIONAL THERAPY: ADLs and home safety, MEDICAL SOCIAL WORK and HOME HEALTH AIDE    Homebound Status (describe the functional limitations that support this patient is confined to his/her home. Medicaid recipients are not required to be homebound.):assistive device needed:  4WW    Name of physician who will be responsible for the ongoing home health plan of care (CMS requires the referring physician to provide the specific name of the community physician instead of a title, such as \"PCP\"): Isela Couch MD    Requested Start of Care Date: Within 48 hours    Other information to assist the home health agency in developing the initial Plan of Care:    I certify that services are/were furnished while this patient was under the care of a physician and that a physician or an allowed non-physician practitioner (NPP), had a face-to-face encounter that meets the physician face-to-face encounter requirements. The encounter was in whole, or in part, related to the primary reason for home health. The patient is confined to his/her home and needs intermittent skilled nursing, physical therapy, speech-language pathology, or the continued " need for occupational therapy. A plan of care has been established by a physician and is periodically reviewed by a physician.    Post Discharge Medication Reconciliation Status: discharge medications reconciled, continue medications without change

## 2021-08-03 PROBLEM — A41.9 SEPSIS (H): Status: RESOLVED | Noted: 2019-11-02 | Resolved: 2019-12-08
